# Patient Record
Sex: FEMALE | Race: BLACK OR AFRICAN AMERICAN | Employment: UNEMPLOYED | URBAN - METROPOLITAN AREA
[De-identification: names, ages, dates, MRNs, and addresses within clinical notes are randomized per-mention and may not be internally consistent; named-entity substitution may affect disease eponyms.]

---

## 2018-11-03 DIAGNOSIS — R80.9 PROTEINURIA: Primary | ICD-10-CM

## 2018-11-06 ENCOUNTER — PATIENT OUTREACH (OUTPATIENT)
Dept: CARE COORDINATION | Facility: CLINIC | Age: 53
End: 2018-11-06

## 2018-11-08 ENCOUNTER — APPOINTMENT (OUTPATIENT)
Dept: LAB | Facility: CLINIC | Age: 53
End: 2018-11-08
Payer: MEDICAID

## 2018-11-08 ENCOUNTER — OFFICE VISIT (OUTPATIENT)
Dept: NEPHROLOGY | Facility: CLINIC | Age: 53
End: 2018-11-08
Attending: INTERNAL MEDICINE
Payer: MEDICAID

## 2018-11-08 VITALS
WEIGHT: 177.4 LBS | OXYGEN SATURATION: 97 % | BODY MASS INDEX: 29.52 KG/M2 | SYSTOLIC BLOOD PRESSURE: 102 MMHG | HEART RATE: 71 BPM | TEMPERATURE: 98.1 F | DIASTOLIC BLOOD PRESSURE: 70 MMHG

## 2018-11-08 DIAGNOSIS — R80.9 PROTEINURIA: ICD-10-CM

## 2018-11-08 DIAGNOSIS — R80.9 PROTEINURIA, UNSPECIFIED TYPE: ICD-10-CM

## 2018-11-08 DIAGNOSIS — R31.29 MICROSCOPIC HEMATURIA: ICD-10-CM

## 2018-11-08 DIAGNOSIS — N17.9 AKI (ACUTE KIDNEY INJURY) (H): Primary | ICD-10-CM

## 2018-11-08 LAB
ALBUMIN SERPL-MCNC: 3.2 G/DL (ref 3.4–5)
ALBUMIN UR-MCNC: 100 MG/DL
ANION GAP SERPL CALCULATED.3IONS-SCNC: 8 MMOL/L (ref 3–14)
APPEARANCE UR: CLEAR
BACTERIA #/AREA URNS HPF: ABNORMAL /HPF
BILIRUB UR QL STRIP: NEGATIVE
BUN SERPL-MCNC: 26 MG/DL (ref 7–30)
CALCIUM SERPL-MCNC: 8.6 MG/DL (ref 8.5–10.1)
CHLORIDE SERPL-SCNC: 105 MMOL/L (ref 94–109)
CO2 SERPL-SCNC: 24 MMOL/L (ref 20–32)
COLOR UR AUTO: YELLOW
CREAT SERPL-MCNC: 2.09 MG/DL (ref 0.52–1.04)
CREAT UR-MCNC: 53 MG/DL
CRP SERPL-MCNC: 25.4 MG/L (ref 0–8)
ERYTHROCYTE [DISTWIDTH] IN BLOOD BY AUTOMATED COUNT: 13 % (ref 10–15)
GFR SERPL CREATININE-BSD FRML MDRD: 25 ML/MIN/1.7M2
GLUCOSE SERPL-MCNC: 90 MG/DL (ref 70–99)
GLUCOSE UR STRIP-MCNC: NEGATIVE MG/DL
HCT VFR BLD AUTO: 26.3 % (ref 35–47)
HGB BLD-MCNC: 8.4 G/DL (ref 11.7–15.7)
HGB UR QL STRIP: ABNORMAL
KETONES UR STRIP-MCNC: NEGATIVE MG/DL
LEUKOCYTE ESTERASE UR QL STRIP: ABNORMAL
MCH RBC QN AUTO: 29.5 PG (ref 26.5–33)
MCHC RBC AUTO-ENTMCNC: 31.9 G/DL (ref 31.5–36.5)
MCV RBC AUTO: 92 FL (ref 78–100)
MICROALBUMIN UR-MCNC: 665 MG/L
MICROALBUMIN/CREAT UR: 1259.47 MG/G CR (ref 0–25)
MUCOUS THREADS #/AREA URNS LPF: PRESENT /LPF
NITRATE UR QL: NEGATIVE
PH UR STRIP: 6 PH (ref 5–7)
PHOSPHATE SERPL-MCNC: 3.6 MG/DL (ref 2.5–4.5)
PLATELET # BLD AUTO: 153 10E9/L (ref 150–450)
POTASSIUM SERPL-SCNC: 3.8 MMOL/L (ref 3.4–5.3)
PROT UR-MCNC: 1.05 G/L
PROT/CREAT 24H UR: 2 G/G CR (ref 0–0.2)
RBC # BLD AUTO: 2.85 10E12/L (ref 3.8–5.2)
RBC #/AREA URNS AUTO: 150 /HPF (ref 0–2)
SODIUM SERPL-SCNC: 138 MMOL/L (ref 133–144)
SOURCE: ABNORMAL
SP GR UR STRIP: 1.01 (ref 1–1.03)
SQUAMOUS #/AREA URNS AUTO: <1 /HPF (ref 0–1)
UROBILINOGEN UR STRIP-MCNC: 0 MG/DL (ref 0–2)
WBC # BLD AUTO: 6.1 10E9/L (ref 4–11)
WBC #/AREA URNS AUTO: 8 /HPF (ref 0–5)

## 2018-11-08 PROCEDURE — 83876 ASSAY MYELOPEROXIDASE: CPT | Performed by: INTERNAL MEDICINE

## 2018-11-08 PROCEDURE — 86160 COMPLEMENT ANTIGEN: CPT | Performed by: INTERNAL MEDICINE

## 2018-11-08 PROCEDURE — 83516 IMMUNOASSAY NONANTIBODY: CPT | Performed by: INTERNAL MEDICINE

## 2018-11-08 PROCEDURE — 86039 ANTINUCLEAR ANTIBODIES (ANA): CPT | Performed by: INTERNAL MEDICINE

## 2018-11-08 PROCEDURE — 82784 ASSAY IGA/IGD/IGG/IGM EACH: CPT | Performed by: INTERNAL MEDICINE

## 2018-11-08 PROCEDURE — T1013 SIGN LANG/ORAL INTERPRETER: HCPCS | Mod: U3,ZF

## 2018-11-08 PROCEDURE — G0499 HEPB SCREEN HIGH RISK INDIV: HCPCS | Performed by: INTERNAL MEDICINE

## 2018-11-08 PROCEDURE — 83883 ASSAY NEPHELOMETRY NOT SPEC: CPT | Performed by: INTERNAL MEDICINE

## 2018-11-08 PROCEDURE — 84156 ASSAY OF PROTEIN URINE: CPT | Performed by: INTERNAL MEDICINE

## 2018-11-08 PROCEDURE — 86140 C-REACTIVE PROTEIN: CPT | Performed by: INTERNAL MEDICINE

## 2018-11-08 PROCEDURE — 84165 PROTEIN E-PHORESIS SERUM: CPT | Performed by: INTERNAL MEDICINE

## 2018-11-08 PROCEDURE — 86038 ANTINUCLEAR ANTIBODIES: CPT | Performed by: INTERNAL MEDICINE

## 2018-11-08 PROCEDURE — 86334 IMMUNOFIX E-PHORESIS SERUM: CPT | Performed by: INTERNAL MEDICINE

## 2018-11-08 PROCEDURE — 86060 ANTISTREPTOLYSIN O TITER: CPT | Performed by: INTERNAL MEDICINE

## 2018-11-08 PROCEDURE — 87389 HIV-1 AG W/HIV-1&-2 AB AG IA: CPT | Performed by: INTERNAL MEDICINE

## 2018-11-08 PROCEDURE — 00000402 ZZHCL STATISTIC TOTAL PROTEIN: Performed by: INTERNAL MEDICINE

## 2018-11-08 PROCEDURE — 86215 DEOXYRIBONUCLEASE ANTIBODY: CPT | Performed by: INTERNAL MEDICINE

## 2018-11-08 PROCEDURE — 36415 COLL VENOUS BLD VENIPUNCTURE: CPT | Performed by: INTERNAL MEDICINE

## 2018-11-08 PROCEDURE — 86335 IMMUNFIX E-PHORSIS/URINE/CSF: CPT | Performed by: INTERNAL MEDICINE

## 2018-11-08 PROCEDURE — 84166 PROTEIN E-PHORESIS/URINE/CSF: CPT | Performed by: INTERNAL MEDICINE

## 2018-11-08 PROCEDURE — 86706 HEP B SURFACE ANTIBODY: CPT | Performed by: INTERNAL MEDICINE

## 2018-11-08 PROCEDURE — 82043 UR ALBUMIN QUANTITATIVE: CPT | Performed by: INTERNAL MEDICINE

## 2018-11-08 PROCEDURE — 86803 HEPATITIS C AB TEST: CPT | Performed by: INTERNAL MEDICINE

## 2018-11-08 PROCEDURE — G0463 HOSPITAL OUTPT CLINIC VISIT: HCPCS | Mod: ZF

## 2018-11-08 PROCEDURE — 85027 COMPLETE CBC AUTOMATED: CPT | Performed by: INTERNAL MEDICINE

## 2018-11-08 PROCEDURE — 80069 RENAL FUNCTION PANEL: CPT | Performed by: INTERNAL MEDICINE

## 2018-11-08 PROCEDURE — 81001 URINALYSIS AUTO W/SCOPE: CPT | Performed by: INTERNAL MEDICINE

## 2018-11-08 ASSESSMENT — PAIN SCALES - GENERAL: PAINLEVEL: NO PAIN (0)

## 2018-11-08 NOTE — PROGRESS NOTES
Josey Franco MRN:[id number] YOB: 1965  Date of Admission:(Not on file)  Primary care provider: Edgardo Steele  Requesting physician: Jn Winkler*    ASSESSMENT AND RECOMMENDATIONS:   ***    Recommendations were communicated to primary team via ***    Seen and discussed with  ***    Stella Mazariegos   [phone]      REASON FOR CONSULT: ***    HISTORY OF PRESENT ILLNESS:  Admitting provider and nursing notes reviewed  Josey Franco is a 53 year old ***    PAST MEDICAL HISTORY:  Reviewed with patient on 11/08/2018   ***  Past Medical History:   Diagnosis Date     Anxiety disorder due to general medical condition with panic attack      CHF (congestive heart failure) (H)      Depression      History of tricuspid valve replacement with bioprosthetic valve 11/29/2016     Pacemaker 2013    Medtronic (for high degree AVB)     PTSD (post-traumatic stress disorder)      Rheumatic heart disease      S/P mitral valve replacement with bioprosthetic valve 11/29/2016    In 2012 at [facility]     S/P MVR (mitral valve replacement)     bioprosthetic     S/P TVR (tricuspid valve replacement)     bioprosthetic       Past Surgical History:   Procedure Laterality Date     CARDIAC SURGERY      pacemaker     DILATION AND CURETTAGE, OPERATIVE HYSTEROSCOPY, COMBINED N/A 9/9/2015    Procedure: COMBINED DILATION AND CURETTAGE, OPERATIVE HYSTEROSCOPY;  Surgeon: Zoila Pritchard MD;  Location: [facility] OR     MITRAL VALVE REPLACEMENT  2013    Biosynthetic valve     OPERATIVE HYSTEROSCOPY WITH MORCELLATOR N/A 9/9/2015    Procedure: OPERATIVE HYSTEROSCOPY WITH MORCELLATOR;  Surgeon: Zoila Pritchard MD;  [facility]: [facility]     REPLACE VALVE TRICUSPID  2013    Biosynthetic valve        MEDICATIONS:  PTA Meds  Prior to Admission medications    Medication Sig Last Dose Taking? Auth Provider   Acetaminophen (TYLENOL PO) Take 325 mg by mouth every 6 hours as needed for mild pain or fever 11/28/2016 at Unknown time   Unknown, Entered By History   acetaminophen-codeine (TYLENOL W/CODEINE NO. 3) 300-30 MG per tablet Take 1 tablet by mouth every 6 hours as needed for mild pain Unknown at Unknown time  Zoila Pritchard MD   albuterol (PROAIR HFA, PROVENTIL HFA, VENTOLIN HFA) 108 (90 BASE) MCG/ACT inhaler Inhale 2 puffs into the lungs 11/28/2016 at Unknown time  Reported, Patient   ALPRAZolam (XANAX PO) Take 0.5-1 mg by mouth daily as needed for anxiety 11/28/2016 at Unknown time  Unknown, Entered By History   cholecalciferol (VITAMIN D3) 12192 UNITS capsule Take 50,000 Units by mouth 11/28/2016 at Unknown time  Reported, Patient   diclofenac (VOLTAREN) 1 % GEL  Unknown at Unknown time  Reported, Patient   diltiazem HCl COATED BEADS 240 MG TB24 Take 240 mg by mouth Unknown at Unknown time  Reported, Patient   levofloxacin (LEVAQUIN) 750 MG tablet Take 1 tablet (750 mg) by mouth daily   Justa Whipple MD   levothyroxine (SYNTHROID) 25 mcg/mL Take 25 mcg by mouth daily Unknown at Unknown time  Reported, Patient   meclizine (ANTIVERT) 25 MG tablet Take 1 tablet (25 mg) by mouth 3 times daily as needed for dizziness (For vertigo)   Justa Whipple MD   Multiple Vitamin (DAILY ISMAEL PO) Take by mouth daily Unknown at Unknown time  Reported, Patient   ondansetron (ZOFRAN-ODT) 4 MG disintegrating tablet Take by mouth every 8 hours as needed for nausea Unknown at Unknown time  Reported, Patient   pantoprazole (PROTONIX) 40 MG EC tablet Take 1 tablet (40 mg) by mouth daily   Justa Whipple MD   SERTRALINE HCL PO Take 100 mg by mouth daily Unknown at Unknown time  Unknown, Entered By History   TRAZODONE HCL PO Take 50 mg by mouth nightly as needed for sleep Unknown at Unknown time  Reported, Patient   ursodiol (ACTIGALL) 300 MG capsule Take 300 mg by mouth Unknown at Unknown time  Reported, Patient   Warfarin Sodium (JANTOVEN PO) Take 1 mg by mouth daily 11/28/16: 1 mg Mon/Wed/Fri; 0.5 mg daily rest of week    Reported, Patient      Current Meds    Infusion Meds      ALLERGIES:    No Known Allergies    REVIEW OF SYSTEMS:  A comprehensive of systems was negative except as noted above.    SOCIAL HISTORY:   Social History     Social History     Marital status:      Spouse name: N/A     Number of children: N/A     Years of education: N/A     Occupational History     Not on file.     Social History Main Topics     Smoking status: Never Smoker     Smokeless tobacco: Never Used     Alcohol use No     Drug use: No     Sexual activity: Not Currently     Partners: Male     Other Topics Concern     Not on file     Social History Narrative     Reviewed with patient ***  *** accompanies Josey Franco in hospital room    FAMILY MEDICAL HISTORY:   Family History   Problem Relation Age of Onset     Family history unknown: Yes     Reviewed with patient ***    PHYSICAL EXAM:   @FLOWSTAT(6,921488,[id number],[id number])@      @FLOWSTAT(8)@ @FLOWSTAT(9,597368,10)@       Wt 80.5 kg (177 lb 6.4 oz)  BMI 29.52 kg/m2   [unfilled]   Admit Weight: 80.5 kg (177 lb 6.4 oz)     GENERAL APPEARANCE: *** distress, *** awake  EYES: *** scleral icterus, pupils equal  Endo: *** goiter, *** moon facies  Lymphatics: no cervical or supraclavicular LAD  Pulmonary: lungs clear to auscultation with equal breath sounds bilaterally, *** clubbing  CV: regular rhythm, normal rate, no rub   - JVD ***   - Edema ***  GI: soft, nontender, normal bowel sounds  MS: no evidence of inflammation in joints, no muscle tenderness  : *** davison  SKIN: no rash, warm, dry, no cyanosis  NEURO: face symmetric, *** asterixis     LABS:   CMP@LABRCNTIPR(na:4,potassium:4,chloride:4,co2:4,aniongap:4,g,bun:4,cr:4,gfrestimated:4,gfrestblack:4,santo:4,ma,phos:4,prottotal:4,albumin:4,bilitotal:4,alkphos:4,ast:4,alt:4)@  CBC@LABRCNTIPR(hgb:4,wbc:4,rbc:4,hct:4,mcv:4,mch:4,mchc:4,rdw:4,plt:4)@  INR@LABRCNTIPR(inr:4,ptt:4)@  ABG@LABRCNTIPR(ph:4,pco2:4,po2:4,hco3:4,baseexcess:4,o2per:4)@    URINE STUDIES  Recent Labs   Lab Test  09/01/15   1225  11/11/14   1205   COLOR  Yellow  Light Yellow   APPEARANCE  Clear  Clear   URINEGLC  Negative  Negative   URINEBILI  Negative  Negative   URINEKETONE  Negative  Negative   SG  1.009  1.006   UBLD  Negative  Negative   URINEPH  5.0  7.0   PROTEIN  Negative  Negative   NITRITE  Negative  Negative   LEUKEST  Small*  Negative   RBCU  <1  1   WBCU  <1  1     No lab results found.  PTH  No lab results found.  IRON STUDIES  No lab results found.    IMAGING:  {   Imaging                     :[id number]}     Stella Mazariegos

## 2018-11-08 NOTE — PROGRESS NOTES
Nephrology Initial Consult  2018      Jn Winkler MD  2018     Name: oJsey Franco  MRN: [id number]  Age: 53 year old  : 1965  Referring provider: Edgardo Steele     Assessment and Plan:  SILVIA (acute kidney injury) (H)  ***    Microscopic hematuria  ***       Follow-up: Data Unavailable     HPI:   Admitting provider and nursing notes reviewed.     Josey Franco is a 53 year old female with a history of *** who presents for evaluation of   ***.     Review of Systems:   Pertinent items are noted in HPI or as below, remainder of complete ROS is negative.      Active Medications:     Current Outpatient Prescriptions:      Acetaminophen (TYLENOL PO), Take 325 mg by mouth every 6 hours as needed for mild pain or fever, Disp: , Rfl:      acetaminophen-codeine (TYLENOL W/CODEINE NO. 3) 300-30 MG per tablet, Take 1 tablet by mouth every 6 hours as needed for mild pain, Disp: 10 tablet, Rfl: 0     albuterol (PROAIR HFA, PROVENTIL HFA, VENTOLIN HFA) 108 (90 BASE) MCG/ACT inhaler, Inhale 2 puffs into the lungs, Disp: , Rfl:      ALPRAZolam (XANAX PO), Take 0.5-1 mg by mouth daily as needed for anxiety, Disp: , Rfl:      cholecalciferol (VITAMIN D3) 79795 UNITS capsule, Take 50,000 Units by mouth, Disp: , Rfl:      diclofenac (VOLTAREN) 1 % GEL, , Disp: , Rfl:      levofloxacin (LEVAQUIN) 750 MG tablet, Take 1 tablet (750 mg) by mouth daily, Disp: 5 tablet, Rfl: 0     levothyroxine (SYNTHROID) 25 mcg/mL, Take 25 mcg by mouth daily, Disp: , Rfl:      meclizine (ANTIVERT) 25 MG tablet, Take 1 tablet (25 mg) by mouth 3 times daily as needed for dizziness (For vertigo), Disp: 30 tablet, Rfl: 0     Multiple Vitamin (DAILY ISMAEL PO), Take by mouth daily, Disp: , Rfl:      ursodiol (ACTIGALL) 300 MG capsule, Take 300 mg by mouth, Disp: , Rfl:      Warfarin Sodium (JANTOVEN PO), Take 1 mg by mouth daily 16: 1 mg Mon/Wed/Fri; 0.5 mg daily rest of week, Disp: , Rfl:      diltiazem HCl COATED  BEADS 240 MG TB24, Take 240 mg by mouth, Disp: , Rfl:      ondansetron (ZOFRAN-ODT) 4 MG disintegrating tablet, Take by mouth every 8 hours as needed for nausea, Disp: , Rfl:      pantoprazole (PROTONIX) 40 MG EC tablet, Take 1 tablet (40 mg) by mouth daily (Patient not taking: Reported on 11/8/2018), Disp: 30 tablet, Rfl: 0     SERTRALINE HCL PO, Take 100 mg by mouth daily, Disp: , Rfl:      TRAZODONE HCL PO, Take 50 mg by mouth nightly as needed for sleep, Disp: , Rfl:      Allergies:   The patient reports no known allergies.      Past Medical History:  Anxiety.   CHF.   Depression.   History of tricuspid valve replacement with bioprosthetic valve.   Pacemaker in place ([employer], for high degree AVB).   PTSD.   Rheumatic heart disease.   History of mitral valve replacement with bioprosthetic valve.   Atrial fibrillation.   Chronic cholecystitis.   Heart failure.   Atrioventricular block.   Social maladjustment.     Patient Active Problem List   Diagnosis     Chest pain     Atrial fibrillation (H)     Chronic cholecystitis     Heart failure (H)     Atrioventricular block     Social maladjustment     Major depressive disorder, recurrent episode, moderate (H)     Mitral stenosis     Posttraumatic stress disorder     Functional disturbance following cardiac surgery     Heart disease, rheumatic     Heart valve replaced     Cardiac pacemaker - Single chamber Medtronic     Keloid scar     Hemoptysis     S/P mitral valve replacement with bioprosthetic valve     History of tricuspid valve replacement with bioprosthetic valve        Past Surgical History:  Past Surgical History:   Procedure Laterality Date     CARDIAC SURGERY      pacemaker     DILATION AND CURETTAGE, OPERATIVE HYSTEROSCOPY, COMBINED N/A 9/9/2015    Procedure: COMBINED DILATION AND CURETTAGE, OPERATIVE HYSTEROSCOPY;  Surgeon: Zoila Pritchard MD;  [facility]     MITRAL VALVE REPLACEMENT  2013    Biosynthetic valve     OPERATIVE HYSTEROSCOPY WITH  MORCELLATOR N/A 9/9/2015    Procedure: OPERATIVE HYSTEROSCOPY WITH MORCELLATOR;  Surgeon: Zoila Pritchard MD;  [facility]     REPLACE VALVE TRICUSPID  2013    Biosynthetic valve       Family History:   Family History   Problem Relation Age of Onset     Family history unknown: Yes         Social History:   Social History     Social History     Marital status:      Spouse name: N/A     Number of children: N/A     Years of education: N/A     Occupational History     Not on file.     Social History Main Topics     Smoking status: Never Smoker     Smokeless tobacco: Never Used     Alcohol use No     Drug use: No     Sexual activity: Not Currently     Partners: Male     Other Topics Concern     Not on file     Social History Narrative     Reviewed with patient ***  *** accompanies Josey Franco in exam room.     Physical Exam:  @FLOWSTAT(6,167182,[id number],654999)@      @FLOWSTAT(8)@ @FLOWSTAT(9,191992,10)@       /70  Pulse 71  Temp 98.1  F (36.7  C) (Oral)  Wt 80.5 kg (177 lb 6.4 oz)  SpO2 97%  BMI 29.52 kg/m2   [unfilled]   Admit Weight: 80.5 kg (177 lb 6.4 oz)    GENERAL APPEARANCE: *** distress, *** awake  EYES: *** scleral icterus, pupils equal  Endo: *** goiter, *** moon facies  Lymphatics: no cervical or supraclavicular LAD  Pulmonary: lungs clear to auscultation with equal breath sounds bilaterally, *** clubbing  CV: regular rhythm, normal rate, no rub   - JVD ***   - Edema ***  GI: soft, nontender, normal bowel sounds  MS: no evidence of inflammation in joints, no muscle tenderness  : *** davison  SKIN: no rash, warm, dry, no cyanosis  NEURO: face symmetric, *** asterixis      Laboratory:  CMP  @LABRCNTIPR(na:4,potassium:4,chloride:4,co2:4,aniongap:4,g,bun:4,cr:4,gfrestimated:4,gfrestblack:4,santo:4,ma,phos:4,prottotal:4,albumin:4,bilitotal:4,alkphos:4,ast:4,alt:4)@  CBC  @LABRCNTIPR(hgb:4,wbc:4,rbc:4,hct:4,mcv:4,mch:4,mchc:4,rdw:4,plt:4)@  INR  @LABRCNTIPR(inr:4,ptt:4)@  ABG  @LABRCNTIPR(ph:4,pco2:4,po2:4,hco3:4,baseexcess:4,o2per:4)@   URINE STUDIES  Recent Labs   Lab Test  18   1500  09/01/15   1225  14   1205   COLOR  Yellow  Yellow  Light Yellow   APPEARANCE  Clear  Clear  Clear   URINEGLC  Negative  Negative  Negative   URINEBILI  Negative  Negative  Negative   URINEKETONE  Negative  Negative  Negative   SG  1.006  1.009  1.006   UBLD  Large*  Negative  Negative   URINEPH  6.0  5.0  7.0   PROTEIN  100*  Negative  Negative   NITRITE  Negative  Negative  Negative   LEUKEST  Trace*  Small*  Negative   RBCU  150*  <1  1   WBCU  8*  <1  1     No lab results found.  PTH  No lab results found.  IRON STUDIES  No lab results found.    Imaging:   ***    Scribe Disclosure:   I, Stella Mazariegos, am serving as a scribe to document services personally performed by Jn Winkler MD at this visit, based upon the provider's statements to me. All documentation has been reviewed by the aforementioned provider prior to being entered into the official medical record.     Portions of this medical record were completed by a scribe. UPON MY REVIEW AND AUTHENTICATION BY ELECTRONIC SIGNATURE, this confirms (a) I performed the applicable clinical services, and (b) the record is accurate.

## 2018-11-08 NOTE — NURSING NOTE
Chief Complaint   Patient presents with     Consult      Proteinuria      /70  Pulse 71  Temp 98.1  F (36.7  C) (Oral)  Wt 80.5 kg (177 lb 6.4 oz)  SpO2 97%  BMI 29.52 kg/m2  Irma Waggoner MA

## 2018-11-08 NOTE — PROGRESS NOTES
Nephrology Initial Consult  2018      Jn Winkler MD  2018     Name: Josey Franco  MRN: [id number]  Age: 53 year old  : 1965  Referring provider: Edgardo Steele     Assessment and Plan:     1. Acute/Subacute Kidney Injury: Baseline Cr ~0.8 mg/dL with recent rise to 1.2 mg/dL in August and today even higher at 2.09 mg/dL. She has microscopic hematuria and albuminuria (1260 mg/g today).  Certainly, her kidney function may have declined from decreased renal perfusion/ischemia from volume depletion and lower blood pressures (102/70 today).  However, ongoing microscopic hematuria, albuminuria and rising serum creatinine raises concern for active glomerulonephritis.  At this point, ANCA or IgA - related proliferative glomerulonephritis is high on the differential.  -extensive serologic work-up underway including evaluation for monoclonal gammopathy  -kidney biopsy seems imperative at this time but is difficult in the setting of ongoing coumadin for AF (related to valvular disease) and to some extent bioprosthetic valvular replacements (MV, TV); unclear if heparin bridge is required  -depending on serologic work-up and/or degree of worsening kidney function we may elect to admit patient for a kidney biopsy.  Outpatient work-up and treatment limited by language barrier, complexity of anticoagulation given her history of AF and valvular replacement as well as her potential need for chemotherapy (pending renal biopsy results)  -will repeat renal panel in 4 days and f/u with her at that time          2. BP/Volume: BP lower today at 102/70. Asymptomatic and euvolemic on exam.  Metoprolol XL recently increased.    -no changes made today but would favor SBP closer to 120 to allow for renal perfusion  -otherwise, continue metoprolol XL and digoxin as prescribed by cardiology for now though will consider decreasing if SBP consistently ~100, especially if kidney function continues to  decline.    -f/u with cardiology    3. Anemia: HGB of 8.4 today with a baseline of around 10-11. Asymptomatic. No historical features to suggest acute blood loss. She is on coumadin. No daily asprin. May be related to decreased EPO production from renal insufficiency.  -paraproteinemic work-up underway  -would like to get iron studies  -will consider EMMA at her upcoming clinic visit    4. Rheumatic heart disease s/p bioprosthetic MV and TV, HFpEF, atrial fibrillation, heart block s/p RV pacemaker: No new issues. On Coumadin therapy. Last INR 2.7. Recently seen in cardiology clinic.  She continues on digoxin and metoprolol Xl that was recently increased (see problem 2).      5. Hypothyroidism:  On levothyroxine (25 mcg daily).  She did not tolerate recent increase in dose.    -f/u with PCP    Follow-up: Return in about 1 month (around 12/8/2018).     HPI:   History obtained with the use of an in person interpretor.     Josey Franco is a 53 year old woman with a history of rheumatic heart disease s/p bioprosthetic MV and TV, HFpEF, atrial fibrillation (on coumadin), heart block s/p RV pacemaker, cholelithiasis and  hypothyroidism, who presents with her son and daughter as a new consult after found to have new renal insufficiency. This is a new problem for the patient and as recent as January of this year she had a creatinine of 0.85 (similar to years prior). However, her creatinine was noted to be 1.24 in August after having gone in for evaluation of vague complaints of nausea and a bitter taste in her mouth. At that time she did have a negative renal ultrasound, which was reviewed in care everywhere. She says the aforementioned symptoms of nausea started shortly after her doctor changed her levothyroxine from 25 mg to 50 mg mcg. She has actually been feeling her normal self over the past several days without recent fevers, abdominal pain, flank or back pain, or difficulties urinating. She has been eating and  "drinking normally. She has not noticed any blood in her urine or stool. No recent joint pain or rash. No leg swelling. Other med changes include discontinuing her Ursodiol a few days ago. States she takes \"pain meds\" almost every day, which son thinks is Acetaminophen. It sounds like no NSAID use. She is taking Protonix for acid reflux. No personal or family history of kidney disease. Of note, patient was also just seen by her cardiologist this past Monday and her metoprolol XL was increased from 100 to 200 mg daily.  She remains on digoxin as well.  Her TTE in May, 2018 showed normal EF of 55-60% and severe bilateral atrial enlargement.       Review of Systems:   Pertinent items are noted in HPI or as below, remainder of complete ROS is negative.      Active Medications:      Acetaminophen (TYLENOL PO), Take 325 mg by mouth every 6 hours as needed for mild pain or fever, Disp: , Rfl:      acetaminophen-codeine (TYLENOL W/CODEINE NO. 3) 300-30 MG per tablet, Take 1 tablet by mouth every 6 hours as needed for mild pain, Disp: 10 tablet, Rfl: 0     albuterol (PROAIR HFA, PROVENTIL HFA, VENTOLIN HFA) 108 (90 BASE) MCG/ACT inhaler, Inhale 2 puffs into the lungs, Disp: , Rfl:      ALPRAZolam (XANAX PO), Take 0.5-1 mg by mouth daily as needed for anxiety, Disp: , Rfl:      cholecalciferol (VITAMIN D3) 71918 UNITS capsule, Take 50,000 Units by mouth, Disp: , Rfl:      diclofenac (VOLTAREN) 1 % GEL, , Disp: , Rfl:      levofloxacin (LEVAQUIN) 750 MG tablet, Take 1 tablet (750 mg) by mouth daily, Disp: 5 tablet, Rfl: 0     levothyroxine (SYNTHROID) 25 mcg/mL, Take 25 mcg by mouth daily, Disp: , Rfl:      meclizine (ANTIVERT) 25 MG tablet, Take 1 tablet (25 mg) by mouth 3 times daily as needed for dizziness (For vertigo), Disp: 30 tablet, Rfl: 0     Multiple Vitamin (DAILY ISMAEL PO), Take by mouth daily, Disp: , Rfl:      ursodiol (ACTIGALL) 300 MG capsule, Take 300 mg by mouth, Disp: , Rfl:      Warfarin Sodium (JANTOVEN PO), " Take 1 mg by mouth daily 11/28/16: 1 mg Mon/Wed/Fri; 0.5 mg daily rest of week, Disp: , Rfl:      diltiazem HCl COATED BEADS 240 MG TB24, Take 240 mg by mouth, Disp: , Rfl:      ondansetron (ZOFRAN-ODT) 4 MG disintegrating tablet, Take by mouth every 8 hours as needed for nausea, Disp: , Rfl:      pantoprazole (PROTONIX) 40 MG EC tablet, Take 1 tablet (40 mg) by mouth daily (Patient not taking: Reported on 11/8/2018), Disp: 30 tablet, Rfl: 0     SERTRALINE HCL PO, Take 100 mg by mouth daily, Disp: , Rfl:      TRAZODONE HCL PO, Take 50 mg by mouth nightly as needed for sleep, Disp: , Rfl:      Allergies:   The patient reports no known allergies.      Past Medical History:  1. Atrial fibrillation, on coumadin   2. Hx of valve replacement, on coumadin   3. Hypothyroidism, on levothyroxine   4. Depression and anxiety   5. CHF  6. Chronic cholecystitis      Past Surgical History:  Cardiac pacemaker   Mitral valve replacement   Tricuspid valve replacement     Family History:   No family hx of kidney disease       Social History:   Here today with her son and daughter. She is non-english speaking. She has been in the [place] for 6 years.     Physical Exam:  /70  Pulse 71  Temp 98.1  F (36.7  C) (Oral)  Wt 80.5 kg (177 lb 6.4 oz)  SpO2 97%  BMI 29.52 kg/m2   [unfilled]   Admit Weight: 80.5 kg (177 lb 6.4 oz)    GENERAL APPEARANCE: NAD  EYES:no scleral icterus, pupils equal  Endo: no moon facies  Lymphatics: no cervical or supraclavicular LAD  Pulmonary: lungs clear to auscultation with equal breath sounds bilaterally  CV: regular rhythm, normal rate, no rub   - No JVD   - No significant edema  GI: soft, nontender, nondistended  MS: no evidence of inflammation in joints, no muscle tenderness  : No CVA tenderness  SKIN: no concerning rash  NEURO: no gross focal deficit    Laboratory:  URINE STUDIES  Recent Labs   Lab Test  11/08/18   1500  09/01/15   1225  11/11/14   1205   COLOR  Yellow  Yellow  Light Yellow    APPEARANCE  Clear  Clear  Clear   URINEGLC  Negative  Negative  Negative   URINEBILI  Negative  Negative  Negative   URINEKETONE  Negative  Negative  Negative   SG  1.006  1.009  1.006   UBLD  Large*  Negative  Negative   URINEPH  6.0  5.0  7.0   PROTEIN  100*  Negative  Negative   NITRITE  Negative  Negative  Negative   LEUKEST  Trace*  Small*  Negative   RBCU  150*  <1  1   WBCU  8*  <1  1     Recent Labs   Lab Test  11/08/18   1500   [facility]  2.00*     PTH  No lab results found.  IRON STUDIES  No lab results found.    Imaging:   10/17/18 Renal U/S:  FINDINGS:  The right kidney measures 11.0 cm in length. Normal cortical echogenicity and thickness. No hydronephrosis.  The left kidney was somewhat more difficult to see. The left kidney measures 11.5 cm in length. Normal cortical echogenicity and thickness. No hydronephrosis.   The urinary bladder was not well distended and measured 6.6 x 4.0 x 3.4 cm.    IMPRESSION:  Left kidney somewhat difficult to visualize, but the kidneys are otherwise unremarkable. No hydronephrosis, either kidney.    Scribe Disclosure:   I, Stella Mazariegos, am serving as a scribe to document services personally performed by Jn Winkler MD at this visit, based upon the provider's statements to me. All documentation has been reviewed by the aforementioned provider prior to being entered into the official medical record.     Portions of this medical record were completed by a scribe. UPON MY REVIEW AND AUTHENTICATION BY ELECTRONIC SIGNATURE, this confirms (a) I performed the applicable clinical services, and (b) the record is accurate.    Jn Winkler MD

## 2018-11-08 NOTE — MR AVS SNAPSHOT
After Visit Summary   11/8/2018    Josey Franco    MRN: [id number]           Patient Information     Date Of Birth          1965        Visit Information        Provider Department      11/8/2018 3:00 PM Ben Schwab; Jn Winkler MD [facility]        Today's Diagnoses     SILVIA (acute kidney injury) (H)    -  1    Microscopic hematuria        Proteinuria, unspecified type           Follow-ups after your visit        Follow-up notes from your care team     Return in about 1 month (around 12/8/2018).      Your next 10 appointments already scheduled     Dec 27, 2018 12:00 PM CST   Lab with  LAB   [facility] ([facility])    [address]  1st Floor  [facility] [address] [phone]            Dec 27, 2018  1:00 PM CST   (Arrive by 12:30 PM)   Return Visit with Jn Winkler MD   [facility] ([facility])    [address]  [address]  [facility] [address] [phone]              Who to contact     If you have questions or need follow up information about today's clinic visit or your schedule please contact [facility] directly at [phone].  Normal or non-critical lab and imaging results will be communicated to you by MyChart, letter or phone within 4 business days after the clinic has received the results. If you do not hear from us within 7 days, please contact the clinic through [employer] or phone. If you have a critical or abnormal lab result, we will notify you by phone as soon as possible.  Submit refill requests through [employer] or call your pharmacy and they will forward the refill request to us. Please allow 3 business days for your refill to be completed.          Additional Information About Your Visit        [employer] Information     [employer] lets you send messages to your doctor, view your test results, renew your prescriptions, schedule appointments and more.  "To sign up, go to [email or web address] . Click on \"Log in\" on the left side of the screen, which will take you to the Welcome page. Then click on \"Sign up Now\" on the right side of the page.     You will be asked to enter the access code listed below, as well as some personal information. Please follow the directions to create your username and password.     Your access code is: [email or web address]  Expires: 2/3/2019  6:30 AM     Your access code will  in 90 days. If you need help or a new code, please call your [facility] clinic or [phone].        Care EveryWhere ID     This is your Care EveryWhere ID. This could be used by other organizations to access your [facility] medical records  [id number]        Your Vitals Were     Pulse Temperature Pulse Oximetry BMI (Body Mass Index)          71 98.1  F (36.7  C) (Oral) 97% 29.52 kg/m2         Blood Pressure from Last 3 Encounters:   18 102/70   16 119/77   16 120/84    Weight from Last 3 Encounters:   18 80.5 kg (177 lb 6.4 oz)   16 83.9 kg (185 lb)   16 83 kg (183 lb)              We Performed the Following     Anti DNase B antibodies     Antistreptolysin O     Complement C3     Complement C4     CRP inflammation     GBM Antibody IgG     Hepatitis B Surface Antibody     Hepatitis B surface antigen     Hepatitis C antibody     HIV Antigen Antibody Combo     Kappa and lambda light chain     Protein electrophoresis random urine     Protein electrophoresis     Protein Immunofixation Serum     Protein immunofixation urine     Vasculitis panel        Primary Care Provider Office Phone # Fax #    Edgardo Steele [phone]       [facility] [address] [place] [address]        Equal Access to Services     DEBRA THOMPSON : Jeremias Whipple, rayna boykin, bouchra molina. So [facility] [phone].    ATENCIÓN: Si jasiel morley " shane disposición servicios gratuitos de asistencia lingüística. Savanna royal [phone].    We comply with applicable federal civil rights laws and [place] laws. We do not discriminate on the basis of race, color, national origin, age, disability, sex, sexual orientation, or gender identity.            Thank you!     Thank you for choosing [facility]  for your care. Our goal is always to provide you with excellent care. Hearing back from our patients is one way we can continue to improve our services. Please take a few minutes to complete the written survey that you may receive in the mail after your visit with us. Thank you!             Your Updated Medication List - Protect others around you: Learn how to safely use, store and throw away your medicines at www.disposemymeds.org.          This list is accurate as of 11/8/18  4:51 PM.  Always use your most recent med list.                   Brand Name Dispense Instructions for use Diagnosis    acetaminophen-codeine 300-30 MG per tablet    TYLENOL w/CODEINE No. 3    10 tablet    Take 1 tablet by mouth every 6 hours as needed for mild pain    Post-op pain       albuterol 108 (90 Base) MCG/ACT inhaler    PROAIR HFA/PROVENTIL HFA/VENTOLIN HFA     Inhale 2 puffs into the lungs        cholecalciferol 71238 units capsule    VITAMIN D3     Take 50,000 Units by mouth        DAILY ISMAEL PO      Take by mouth daily        diclofenac 1 % Gel topical gel    VOLTAREN          diltiazem HCl COATED BEADS 240 MG Tb24      Take 240 mg by mouth        JANTOVEN PO      Take 1 mg by mouth daily 11/28/16: 1 mg Mon/Wed/Fri; 0.5 mg daily rest of week        levofloxacin 750 MG tablet    LEVAQUIN    5 tablet    Take 1 tablet (750 mg) by mouth daily    Positive culture findings in sputum       levothyroxine 25 mcg/mL Susp    SYNTHROID     Take 25 mcg by mouth daily        meclizine 25 MG tablet    ANTIVERT    30 tablet    Take 1 tablet (25 mg) by mouth 3 times daily as needed for  dizziness (For vertigo)    Vertigo       ondansetron 4 MG ODT tab    ZOFRAN-ODT     Take by mouth every 8 hours as needed for nausea        pantoprazole 40 MG EC tablet    PROTONIX    30 tablet    Take 1 tablet (40 mg) by mouth daily    Hemoptysis       SERTRALINE HCL PO      Take 100 mg by mouth daily        TRAZODONE HCL PO      Take 50 mg by mouth nightly as needed for sleep        TYLENOL PO      Take 325 mg by mouth every 6 hours as needed for mild pain or fever        ursodiol 300 MG capsule    ACTIGALL     Take 300 mg by mouth        XANAX PO      Take 0.5-1 mg by mouth daily as needed for anxiety

## 2018-11-08 NOTE — LETTER
2018      RE: Josey Frnaco  [address]  [address]  [facility] [address]         Nephrology Initial Consult  2018      Jn Winkler MD  2018     Name: Josey Franco  MRN: [id number]  Age: 53 year old  : 1965  Referring provider: Edgardo Steele     Assessment and Plan:     1. Acute/Subacute Kidney Injury: Baseline Cr ~0.8 mg/dL with recent rise to 1.2 mg/dL in August and today even higher at 2.09 mg/dL. She has microscopic hematuria and albuminuria (1260 mg/g today).  Certainly, her kidney function may have declined from decreased renal perfusion/ischemia from volume depletion and lower blood pressures (102/70 today).  However, ongoing microscopic hematuria, albuminuria and rising serum creatinine raises concern for active glomerulonephritis.  At this point, ANCA or IgA - related proliferative glomerulonephritis is high on the differential.  -extensive serologic work-up underway including evaluation for monoclonal gammopathy  -kidney biopsy seems imperative at this time but is difficult in the setting of ongoing coumadin for AF (related to valvular disease) and to some extent bioprosthetic valvular replacements (MV, TV); unclear if heparin bridge is required  -depending on serologic work-up and/or degree of worsening kidney function we may elect to admit patient for a kidney biopsy.  Outpatient work-up and treatment limited by language barrier, complexity of anticoagulation given her history of AF and valvular replacement as well as her potential need for chemotherapy (pending renal biopsy results)  -will repeat renal panel in 4 days and f/u with her at that time          2. BP/Volume: BP lower today at 102/70. Asymptomatic and euvolemic on exam.  Metoprolol XL recently increased.    -no changes made today but would favor SBP closer to 120 to allow for renal perfusion  -otherwise, continue metoprolol XL and digoxin as prescribed by cardiology for now though will  consider decreasing if SBP consistently ~100, especially if kidney function continues to decline.    -f/u with cardiology    3. Anemia: HGB of 8.4 today with a baseline of around 10-11. Asymptomatic. No historical features to suggest acute blood loss. She is on coumadin. No daily asprin. May be related to decreased EPO production from renal insufficiency.  -paraproteinemic work-up underway  -would like to get iron studies  -will consider EMMA at her upcoming clinic visit    4. Rheumatic heart disease s/p bioprosthetic MV and TV, HFpEF, atrial fibrillation, heart block s/p RV pacemaker: No new issues. On Coumadin therapy. Last INR 2.7. Recently seen in cardiology clinic.  She continues on digoxin and metoprolol Xl that was recently increased (see problem 2).      5. Hypothyroidism:  On levothyroxine (25 mcg daily).  She did not tolerate recent increase in dose.    -f/u with PCP    Follow-up: Return in about 1 month (around 12/8/2018).     HPI:   History obtained with the use of an in person interpretor.     Josey Franco is a 53 year old woman with a history of rheumatic heart disease s/p bioprosthetic MV and TV, HFpEF, atrial fibrillation (on coumadin), heart block s/p RV pacemaker, cholelithiasis and  hypothyroidism, who presents with her son and daughter as a new consult after found to have new renal insufficiency. This is a new problem for the patient and as recent as January of this year she had a creatinine of 0.85 (similar to years prior). However, her creatinine was noted to be 1.24 in August after having gone in for evaluation of vague complaints of nausea and a bitter taste in her mouth. At that time she did have a negative renal ultrasound, which was reviewed in care everywhere. She says the aforementioned symptoms of nausea started shortly after her doctor changed her levothyroxine from 25 mg to 50 mg mcg. She has actually been feeling her normal self over the past several days without recent fevers,  "abdominal pain, flank or back pain, or difficulties urinating. She has been eating and drinking normally. She has not noticed any blood in her urine or stool. No recent joint pain or rash. No leg swelling. Other med changes include discontinuing her Ursodiol a few days ago. States she takes \"pain meds\" almost every day, which son thinks is Acetaminophen. It sounds like no NSAID use. She is taking Protonix for acid reflux. No personal or family history of kidney disease. Of note, patient was also just seen by her cardiologist this past Monday and her metoprolol XL was increased from 100 to 200 mg daily.  She remains on digoxin as well.  Her TTE in May, 2018 showed normal EF of 55-60% and severe bilateral atrial enlargement.       Review of Systems:   Pertinent items are noted in HPI or as below, remainder of complete ROS is negative.      Active Medications:      Acetaminophen (TYLENOL PO), Take 325 mg by mouth every 6 hours as needed for mild pain or fever, Disp: , Rfl:      acetaminophen-codeine (TYLENOL W/CODEINE NO. 3) 300-30 MG per tablet, Take 1 tablet by mouth every 6 hours as needed for mild pain, Disp: 10 tablet, Rfl: 0     albuterol (PROAIR HFA, PROVENTIL HFA, VENTOLIN HFA) 108 (90 BASE) MCG/ACT inhaler, Inhale 2 puffs into the lungs, Disp: , Rfl:      ALPRAZolam (XANAX PO), Take 0.5-1 mg by mouth daily as needed for anxiety, Disp: , Rfl:      cholecalciferol (VITAMIN D3) 95197 UNITS capsule, Take 50,000 Units by mouth, Disp: , Rfl:      diclofenac (VOLTAREN) 1 % GEL, , Disp: , Rfl:      levofloxacin (LEVAQUIN) 750 MG tablet, Take 1 tablet (750 mg) by mouth daily, Disp: 5 tablet, Rfl: 0     levothyroxine (SYNTHROID) 25 mcg/mL, Take 25 mcg by mouth daily, Disp: , Rfl:      meclizine (ANTIVERT) 25 MG tablet, Take 1 tablet (25 mg) by mouth 3 times daily as needed for dizziness (For vertigo), Disp: 30 tablet, Rfl: 0     Multiple Vitamin (DAILY ISMAEL PO), Take by mouth daily, Disp: , Rfl:      ursodiol (ACTIGALL) " 300 MG capsule, Take 300 mg by mouth, Disp: , Rfl:      Warfarin Sodium (JANTOVEN PO), Take 1 mg by mouth daily 11/28/16: 1 mg Mon/Wed/Fri; 0.5 mg daily rest of week, Disp: , Rfl:      diltiazem HCl COATED BEADS 240 MG TB24, Take 240 mg by mouth, Disp: , Rfl:      ondansetron (ZOFRAN-ODT) 4 MG disintegrating tablet, Take by mouth every 8 hours as needed for nausea, Disp: , Rfl:      pantoprazole (PROTONIX) 40 MG EC tablet, Take 1 tablet (40 mg) by mouth daily (Patient not taking: Reported on 11/8/2018), Disp: 30 tablet, Rfl: 0     SERTRALINE HCL PO, Take 100 mg by mouth daily, Disp: , Rfl:      TRAZODONE HCL PO, Take 50 mg by mouth nightly as needed for sleep, Disp: , Rfl:      Allergies:   The patient reports no known allergies.      Past Medical History:  1. Atrial fibrillation, on coumadin   2. Hx of valve replacement, on coumadin   3. Hypothyroidism, on levothyroxine   4. Depression and anxiety   5. CHF  6. Chronic cholecystitis      Past Surgical History:  Cardiac pacemaker   Mitral valve replacement   Tricuspid valve replacement     Family History:   No family hx of kidney disease       Social History:   Here today with her son and daughter. She is non-english speaking. She has been in the [place] 6 [place].     Physical Exam:  /70  Pulse 71  Temp 98.1  F (36.7  C) (Oral)  Wt 80.5 kg (177 lb 6.4 oz)  SpO2 97%  BMI 29.52 kg/m2   [unfilled]   Admit Weight: 80.5 kg (177 lb 6.4 oz)    GENERAL APPEARANCE: NAD  EYES:no scleral icterus, pupils equal  Endo: no moon facies  Lymphatics: no cervical or supraclavicular LAD  Pulmonary: lungs clear to auscultation with equal breath sounds bilaterally  CV: regular rhythm, normal rate, no rub   - No JVD   - No significant edema  GI: soft, nontender, nondistended  MS: no evidence of inflammation in joints, no muscle tenderness  : No CVA tenderness  SKIN: no concerning rash  NEURO: no gross focal deficit    Laboratory:  URINE STUDIES  Recent Labs   Lab Test   11/08/18   1500  09/01/15   1225  11/11/14   1205   COLOR  Yellow  Yellow  Light Yellow   APPEARANCE  Clear  Clear  Clear   URINEGLC  Negative  Negative  Negative   URINEBILI  Negative  Negative  Negative   URINEKETONE  Negative  Negative  Negative   SG  1.006  1.009  1.006   UBLD  Large*  Negative  Negative   URINEPH  6.0  5.0  7.0   PROTEIN  100*  Negative  Negative   NITRITE  Negative  Negative  Negative   LEUKEST  Trace*  Small*  Negative   RBCU  150*  <1  1   WBCU  8*  <1  1     Recent Labs   Lab Test  11/08/18   1500   [facility]  2.00*     PTH  No lab results found.  IRON STUDIES  No lab results found.    Imaging:   10/17/18 Renal U/S:  FINDINGS:  The right kidney measures 11.0 cm in length. Normal cortical echogenicity and thickness. No hydronephrosis.  The left kidney was somewhat more difficult to see. The left kidney measures 11.5 cm in length. Normal cortical echogenicity and thickness. No hydronephrosis.   The urinary bladder was not well distended and measured 6.6 x 4.0 x 3.4 cm.    IMPRESSION:  Left kidney somewhat difficult to visualize, but the kidneys are otherwise unremarkable. No hydronephrosis, either kidney.    Scribe Disclosure:   I, Stella Mazariegos, am serving as a scribe to document services personally performed by Jn Winkler MD at this visit, based upon the provider's statements to me. All documentation has been reviewed by the aforementioned provider prior to being entered into the official medical record.     Portions of this medical record were completed by a scribe. UPON MY REVIEW AND AUTHENTICATION BY ELECTRONIC SIGNATURE, this confirms (a) I performed the applicable clinical services, and (b) the record is accurate.    Jn Winkler MD

## 2018-11-09 DIAGNOSIS — R79.89 ELEVATED SERUM CREATININE: Primary | ICD-10-CM

## 2018-11-09 LAB
ALBUMIN MFR UR ELPH: 66.6 %
ALBUMIN SERPL ELPH-MCNC: 3.9 G/DL (ref 3.7–5.1)
ALPHA1 GLOB MFR UR ELPH: 4.8 %
ALPHA1 GLOB SERPL ELPH-MCNC: 0.4 G/DL (ref 0.2–0.4)
ALPHA2 GLOB MFR UR ELPH: 5.9 %
ALPHA2 GLOB SERPL ELPH-MCNC: 0.9 G/DL (ref 0.5–0.9)
B-GLOBULIN MFR UR ELPH: 13.1 %
B-GLOBULIN SERPL ELPH-MCNC: 1.1 G/DL (ref 0.6–1)
C3 SERPL-MCNC: 114 MG/DL (ref 76–169)
C4 SERPL-MCNC: 38 MG/DL (ref 15–50)
GAMMA GLOB MFR UR ELPH: 9.6 %
GAMMA GLOB SERPL ELPH-MCNC: 2.1 G/DL (ref 0.7–1.6)
GBM IGG SER IA-ACNC: <0.2 AI (ref 0–0.9)
HBV SURFACE AB SERPL IA-ACNC: 899.43 M[IU]/ML
HBV SURFACE AG SERPL QL IA: NONREACTIVE
HCV AB SERPL QL IA: NONREACTIVE
HIV 1+2 AB+HIV1 P24 AG SERPL QL IA: NONREACTIVE
IGA SERPL-MCNC: 531 MG/DL (ref 70–380)
IGG SERPL-MCNC: 1970 MG/DL (ref 695–1620)
IGM SERPL-MCNC: 230 MG/DL (ref 60–265)
KAPPA LC UR-MCNC: 3.94 MG/DL (ref 0.33–1.94)
KAPPA LC/LAMBDA SER: 0.62 {RATIO} (ref 0.26–1.65)
LAMBDA LC SERPL-MCNC: 6.33 MG/DL (ref 0.57–2.63)
M PROTEIN MFR UR ELPH: 0 %
M PROTEIN SERPL ELPH-MCNC: 0 G/DL
MYELOPEROXIDASE AB SER-ACNC: <0.2 AI (ref 0–0.9)
PROT ELPH PNL UR ELPH: NORMAL
PROT PATTERN SERPL ELPH-IMP: ABNORMAL
PROT PATTERN SERPL IFE-IMP: ABNORMAL
PROT PATTERN UR ELPH-IMP: ABNORMAL
PROTEINASE3 IGG SER-ACNC: 0.6 AI (ref 0–0.9)

## 2018-11-09 NOTE — PROGRESS NOTES
Per Dr. Winkler, recommend renal panel on Monday. Call to patient using  services, communicated to patient who will get lab drawn on Monday after 1030, asked to schedule appointment to discuss results and follow up then with . Will update Dr. Winkler.  Ann Lu LPN  Nephrology  [phone]

## 2018-11-12 ENCOUNTER — OFFICE VISIT (OUTPATIENT)
Dept: NEPHROLOGY | Facility: CLINIC | Age: 53
End: 2018-11-12
Attending: INTERNAL MEDICINE
Payer: MEDICAID

## 2018-11-12 VITALS
SYSTOLIC BLOOD PRESSURE: 116 MMHG | HEIGHT: 65 IN | HEART RATE: 80 BPM | DIASTOLIC BLOOD PRESSURE: 81 MMHG | WEIGHT: 176.4 LBS | BODY MASS INDEX: 29.39 KG/M2 | OXYGEN SATURATION: 100 % | TEMPERATURE: 97.2 F

## 2018-11-12 DIAGNOSIS — R79.89 ELEVATED SERUM CREATININE: ICD-10-CM

## 2018-11-12 DIAGNOSIS — N17.9 AKI (ACUTE KIDNEY INJURY) (H): Primary | ICD-10-CM

## 2018-11-12 LAB
ALBUMIN SERPL-MCNC: 3.4 G/DL (ref 3.4–5)
ANA PAT SER IF-IMP: ABNORMAL
ANA SER QL IF: ABNORMAL
ANA TITR SER IF: ABNORMAL {TITER}
ANION GAP SERPL CALCULATED.3IONS-SCNC: 7 MMOL/L (ref 3–14)
ASO AB SERPL-ACNC: <25 IU/ML (ref 0–120)
BUN SERPL-MCNC: 23 MG/DL (ref 7–30)
CALCIUM SERPL-MCNC: 8.8 MG/DL (ref 8.5–10.1)
CHLORIDE SERPL-SCNC: 106 MMOL/L (ref 94–109)
CO2 SERPL-SCNC: 24 MMOL/L (ref 20–32)
CREAT SERPL-MCNC: 1.86 MG/DL (ref 0.52–1.04)
GFR SERPL CREATININE-BSD FRML MDRD: 28 ML/MIN/1.7M2
GLUCOSE SERPL-MCNC: 78 MG/DL (ref 70–99)
PHOSPHATE SERPL-MCNC: 3.9 MG/DL (ref 2.5–4.5)
POTASSIUM SERPL-SCNC: 4.2 MMOL/L (ref 3.4–5.3)
SODIUM SERPL-SCNC: 137 MMOL/L (ref 133–144)
STREP DNASE B SER-ACNC: 51.1 U/ML

## 2018-11-12 PROCEDURE — 80069 RENAL FUNCTION PANEL: CPT | Performed by: INTERNAL MEDICINE

## 2018-11-12 PROCEDURE — 36415 COLL VENOUS BLD VENIPUNCTURE: CPT | Performed by: INTERNAL MEDICINE

## 2018-11-12 PROCEDURE — G0463 HOSPITAL OUTPT CLINIC VISIT: HCPCS | Mod: ZF

## 2018-11-12 RX ORDER — METOPROLOL SUCCINATE 200 MG/1
100 TABLET, EXTENDED RELEASE ORAL DAILY
Refills: 3 | COMMUNITY
Start: 2018-05-04

## 2018-11-12 ASSESSMENT — PAIN SCALES - GENERAL: PAINLEVEL: NO PAIN (0)

## 2018-11-12 NOTE — LETTER
2018      RE: Josey Franco  [address]  [address]  [facility] [address]         Nephrology Follow-up  2018      Jn Winkler MD  2018     Name: Josey Franco  MRN: [id number]  Age: 53 year old  : 1965  Referring provider: Edgardo Steele     Assessment and Plan:  1. Acute/Subacute Kidney Injury: Baseline Cr ~0.8 mg/dL with recent rise to 2.4 mg/dL though is much lower today at 1.8 mg/dL.  She has microscopic hematuria and albuminuria (1260 mg/g today).  Certainly, her kidney function may have declined from decreased renal perfusion/ischemia from volume depletion and lower blood pressures (102/70 today).  However, ongoing microscopic hematuria, albuminuria and elevated creatinine raises concern for active glomerulonephritis.  At this point, ANCA or IgA - related proliferative glomerulonephritis is high on the differential.  - Patient has deferred on biopsy and further treatments at this time, is electing to continue with plans of traveling home, waiting and seeing, and further discussing biopsy and treatments with her family.   - We will plan on completing weekly laboratory studies to evaluate her renal function. Patient is agreeable to canceling her trip should her renal functions continue to worsen.   - I discussed the potential for irreparable damage to the kidneys and the possibility of requiring dialysis should continued renal damage occur.   - I also informed and educated the patient of dialysis.  - There is no evidence of infection at this time per urinalysis completed one week ago.  - She voiced understanding of all risks involved with deferring on biopsy and further treatments for the time being  - Fortunately, her renal function is improving suggesting that much of her rise in Cr may have been due to a hemodynamic insult but underlying glomerular disorder seems likely though may not be as active as I initially thought  - RTC in 3 months    2.  BP/Volume: BP  higher today at 116/81 as compared to her last evaluation. Asymptomatic and euvolemic on exam.  Metoprolol XL recently increased.    -no changes made today but would favor SBP closer to 120 to allow for renal perfusion  -otherwise, continue metoprolol XL and digoxin as prescribed by cardiology for now though will consider decreasing if SBP consistently ~100, especially if kidney function continues to decline.    -f/u with cardiology     3. Anemia: HGB of 8.4 on 11/08/18 with a baseline of around 10-11. Asymptomatic. No historical features to suggest acute blood loss. She is on coumadin. No daily asprin. May be related to decreased EPO production from renal insufficiency.  -paraproteinemic work-up unremarkable  -would like to get iron studies  -will consider EMMA at her upcoming clinic visit     4. Rheumatic heart disease s/p bioprosthetic MV and TV, HFpEF, atrial fibrillation, heart block s/p RV pacemaker: No new issues. On Coumadin therapy. Last INR 2.7. Recently seen in cardiology clinic.  She continues on digoxin and metoprolol Xl that was recently increased (see problem 2).       5. Hypothyroidism:  On levothyroxine (25 mcg daily).  She did not tolerate recent increase in dose.    -f/u with PCP    Follow-up: Return in about 3 months (around 2/12/2019).     HPI:   History obtained with the use of an in person interpretor.     Josey Franco is a 53 year old female with a history of rheumatic heart disease, is status-post bioprosthetic MV and TV, HFpEF, atrial fibrillation (on coumadin), heart block status-post RV pacemaker, cholelithiasis, and hypothyroidism who presents for follow-up of acute/subacute kidney injury. I last evaluated this patient on 11/08/18, at which time she reported a creatinine of 1.24 in august following vague complains of nausea and a bitter taste in her mouth. Renal ultrasound was negative at that time. She had been feeling relatively well the days leading up to her last  appointment with myself without any abdominal pain, fevers, flank pain, back pain, or difficulty with urination. Today, patient reports things have gone well since her last evaluation. She has not noted any difficulty in urination. Of note, she is planning on traveling home for a long period of time.     Review of Systems:   Pertinent items are noted in HPI or as below, remainder of complete ROS is negative.      Active Medications:   Current Outpatient Prescriptions:      Acetaminophen (TYLENOL PO), Take 325 mg by mouth every 6 hours as needed for mild pain or fever, Disp: , Rfl:      albuterol (PROAIR HFA, PROVENTIL HFA, VENTOLIN HFA) 108 (90 BASE) MCG/ACT inhaler, Inhale 2 puffs into the lungs, Disp: , Rfl:      ALPRAZolam (XANAX PO), Take 0.5-1 mg by mouth daily as needed for anxiety, Disp: , Rfl:      cholecalciferol (VITAMIN D3) 19896 UNITS capsule, Take 50,000 Units by mouth, Disp: , Rfl:      diclofenac (VOLTAREN) 1 % GEL, , Disp: , Rfl:      diltiazem HCl COATED BEADS 240 MG TB24, Take 240 mg by mouth, Disp: , Rfl:      levofloxacin (LEVAQUIN) 750 MG tablet, Take 1 tablet (750 mg) by mouth daily, Disp: 5 tablet, Rfl: 0     levothyroxine (SYNTHROID) 25 mcg/mL, Take 25 mcg by mouth daily, Disp: , Rfl:      meclizine (ANTIVERT) 25 MG tablet, Take 1 tablet (25 mg) by mouth 3 times daily as needed for dizziness (For vertigo), Disp: 30 tablet, Rfl: 0     Multiple Vitamin (DAILY ISMAEL PO), Take by mouth daily, Disp: , Rfl:      ondansetron (ZOFRAN-ODT) 4 MG disintegrating tablet, Take by mouth every 8 hours as needed for nausea, Disp: , Rfl:      pantoprazole (PROTONIX) 40 MG EC tablet, Take 1 tablet (40 mg) by mouth daily (Patient not taking: Reported on 11/8/2018), Disp: 30 tablet, Rfl: 0     SERTRALINE HCL PO, Take 100 mg by mouth daily, Disp: , Rfl:      TRAZODONE HCL PO, Take 50 mg by mouth nightly as needed for sleep, Disp: , Rfl:      ursodiol (ACTIGALL) 300 MG capsule, Take 300 mg by mouth, Disp: , Rfl:      " Warfarin Sodium (JANTOVEN PO), Take 1 mg by mouth daily 11/28/16: 1 mg Mon/Wed/Fri; 0.5 mg daily rest of week, Disp: , Rfl:      Allergies:   No known drug allergies      Past Medical History:  Congestive heart failure  Depression  Anxiety  PTSD  Rheumatic heart disease  Atrial fibrillation  Chronic cholecystitis  Heart Failure  Atrioventricular block  Mitral stenosis     Past Surgical History:  Pacemaker placement  Dilation and curettage  Mitral valve replacement  Hysteroscopy  Tricuspid valve replacement     Family History:   Family history is unknown to the patient      Social History:     Never smoker. Does not consume alcohol.     Reviewed with patient in exam room.     Physical Exam:  /81  Pulse 80  Temp 97.2  F (36.2  C) (Oral)  Ht 1.657 m (5' 5.25\")  Wt 80 kg (176 lb 6.4 oz)  SpO2 100%  BMI 29.13 kg/m2   [unfilled]   Admit      GENERAL APPEARANCE: NAD  EYES: no scleral icterus, pupils equal  Endo: no moon facies  Lymphatics: no cervical or supraclavicular LAD  Pulmonary: lungs clear to auscultation with equal breath sounds bilaterally  CV: regular rhythm, normal rate, no rub   - No JVD    - No edema *  GI: soft, nontender, nondistended  MS: no evidence of inflammation in joints, no muscle tenderness  : No CVA tendereness  SKIN: no concerning rash  NEURO: no focal deficit    Laboratory:  CMP  Recent Labs   Lab Test  11/12/18   1107  11/08/18   1443  12/02/16   0758  12/01/16   0643   11/28/16   1104  11/11/14   1055   NA  137  138  140  138   < >  141  136   POTASSIUM  4.2  3.8  3.7  3.8   < >  3.6  3.8   CHLORIDE  106  105  108  107   < >  108  105   CO2  24  24  23  22   < >  23  26   ANIONGAP  7  8  10  9   < >  10  5   GLC  78  90  90  90   < >  88  90   BUN  23  26  8  6*   < >  7  9   CR  1.86*  2.09*  0.80  0.73   < >  0.70  0.71   GFRESTIMATED  28*  25*  76  85   < >  89  88   GFRESTBLACK  34*  30*  >90   GFR Calc    >90   GFR Calc     < >  " >90   GFR Calc    >90   GFR Calc     ARCADIO  8.8  8.6  8.6  8.5   < >  8.9  8.7   PHOS  3.9  3.6   --    --    --    --    --    PROTTOTAL   --    --    --    --    --   8.4  8.8   ALBUMIN  3.4  3.2*   --    --    --   3.4  3.5   BILITOTAL   --    --    --    --    --   0.5  0.4   ALKPHOS   --    --    --    --    --   111  151*   AST   --    --    --    --    --   27  21   ALT   --    --    --    --    --   18  17    < > = values in this interval not displayed.     CBC  Recent Labs   Lab Test  11/08/18   1443  12/02/16   0758  11/30/16   1959  11/30/16   0617   HGB  8.4*  11.4*  10.8*  10.4*   WBC  6.1  5.8  5.5  4.5   RBC  2.85*  3.85  3.61*  3.60*   HCT  26.3*  34.1*  32.5*  32.1*   MCV  92  89  90  89   MCH  29.5  29.6  29.9  28.9   MCHC  31.9  33.4  33.2  32.4   RDW  13.0  14.4  14.3  14.3   PLT  153  162  160  141*     INR  Recent Labs   Lab Test  12/02/16   0758  12/01/16   0643  11/30/16   0617  11/29/16   1830   INR  2.06*  2.52*  3.00*  2.65*     ABG  No lab results found.   URINE STUDIES  Recent Labs   Lab Test  11/08/18   1500  09/01/15   1225  11/11/14   1205   COLOR  Yellow  Yellow  Light Yellow   APPEARANCE  Clear  Clear  Clear   URINEGLC  Negative  Negative  Negative   URINEBILI  Negative  Negative  Negative   URINEKETONE  Negative  Negative  Negative   SG  1.006  1.009  1.006   UBLD  Large*  Negative  Negative   URINEPH  6.0  5.0  7.0   PROTEIN  100*  Negative  Negative   NITRITE  Negative  Negative  Negative   LEUKEST  Trace*  Small*  Negative   RBCU  150*  <1  1   WBCU  8*  <1  1     Recent Labs   Lab Test  11/08/18   1500   UTPG  2.00*     PTH  No lab results found.  IRON STUDIES   No lab results found.    Scribe Disclosure:   I, Larry Wiggins, am serving as a scribe to document services personally performed by Jn Winkler MD at this visit, based upon the provider's statements to me. All documentation has been reviewed by the aforementioned provider prior  to being entered into the official medical record.     Portions of this medical record were completed by a scribe. UPON MY REVIEW AND AUTHENTICATION BY ELECTRONIC SIGNATURE, this confirms (a) I performed the applicable clinical services, and (b) the record is accurate.    Total time spent was >40 minutes, and more than 50% of face to face time was spent in counseling and/or coordination of care regarding principles of multidisciplinary care, medication management, and chronic kidney disease education.  Jn Winkler MD

## 2018-11-12 NOTE — PROGRESS NOTES
Nephrology Follow-up  2018      Jn Winkler MD  2018     Name: Josey Franco  MRN: [id number]  Age: 53 year old  : 1965  Referring provider: Edgardo Steele     Assessment and Plan:  1. Acute/Subacute Kidney Injury: Baseline Cr ~0.8 mg/dL with recent rise to 2.4 mg/dL though is much lower today at 1.8 mg/dL.  She has microscopic hematuria and albuminuria (1260 mg/g today).  Certainly, her kidney function may have declined from decreased renal perfusion/ischemia from volume depletion and lower blood pressures (102/70 today).  However, ongoing microscopic hematuria, albuminuria and elevated creatinine raises concern for active glomerulonephritis.  At this point, ANCA or IgA - related proliferative glomerulonephritis is high on the differential.  - Patient has deferred on biopsy and further treatments at this time, is electing to continue with plans of traveling home, waiting and seeing, and further discussing biopsy and treatments with her family.   - We will plan on completing weekly laboratory studies to evaluate her renal function. Patient is agreeable to canceling her trip should her renal functions continue to worsen.   - I discussed the potential for irreparable damage to the kidneys and the possibility of requiring dialysis should continued renal damage occur.   - I also informed and educated the patient of dialysis.  - There is no evidence of infection at this time per urinalysis completed one week ago.  - She voiced understanding of all risks involved with deferring on biopsy and further treatments for the time being  - Fortunately, her renal function is improving suggesting that much of her rise in Cr may have been due to a hemodynamic insult but underlying glomerular disorder seems likely though may not be as active as I initially thought  - RTC in 3 months    2. BP/Volume: BP higher today at 116/81 as compared to her last evaluation. Asymptomatic and euvolemic on  exam.  Metoprolol XL recently increased.    -no changes made today but would favor SBP closer to 120 to allow for renal perfusion  -otherwise, continue metoprolol XL and digoxin as prescribed by cardiology for now though will consider decreasing if SBP consistently ~100, especially if kidney function continues to decline.    -f/u with cardiology     3. Anemia: HGB of 8.4 on 11/08/18 with a baseline of around 10-11. Asymptomatic. No historical features to suggest acute blood loss. She is on coumadin. No daily asprin. May be related to decreased EPO production from renal insufficiency.  -paraproteinemic work-up unremarkable  -would like to get iron studies  -will consider EMMA at her upcoming clinic visit     4. Rheumatic heart disease s/p bioprosthetic MV and TV, HFpEF, atrial fibrillation, heart block s/p RV pacemaker: No new issues. On Coumadin therapy. Last INR 2.7. Recently seen in cardiology clinic.  She continues on digoxin and metoprolol Xl that was recently increased (see problem 2).       5. Hypothyroidism:  On levothyroxine (25 mcg daily).  She did not tolerate recent increase in dose.    -f/u with PCP    Follow-up: Return in about 3 months (around 2/12/2019).     HPI:   History obtained with the use of an in person interpretor.     Josey Franco is a 53 year old female with a history of rheumatic heart disease, is status-post bioprosthetic MV and TV, HFpEF, atrial fibrillation (on coumadin), heart block status-post RV pacemaker, cholelithiasis, and hypothyroidism who presents for follow-up of acute/subacute kidney injury. I last evaluated this patient on 11/08/18, at which time she reported a creatinine of 1.24 in august following vague complains of nausea and a bitter taste in her mouth. Renal ultrasound was negative at that time. She had been feeling relatively well the days leading up to her last appointment with myself without any abdominal pain, fevers, flank pain, back pain, or difficulty with  urination. Today, patient reports things have gone well since her last evaluation. She has not noted any difficulty in urination. Of note, she is planning on traveling home for a long period of time.     Review of Systems:   Pertinent items are noted in HPI or as below, remainder of complete ROS is negative.      Active Medications:   Current Outpatient Prescriptions:      Acetaminophen (TYLENOL PO), Take 325 mg by mouth every 6 hours as needed for mild pain or fever, Disp: , Rfl:      albuterol (PROAIR HFA, PROVENTIL HFA, VENTOLIN HFA) 108 (90 BASE) MCG/ACT inhaler, Inhale 2 puffs into the lungs, Disp: , Rfl:      ALPRAZolam (XANAX PO), Take 0.5-1 mg by mouth daily as needed for anxiety, Disp: , Rfl:      cholecalciferol (VITAMIN D3) 37970 UNITS capsule, Take 50,000 Units by mouth, Disp: , Rfl:      diclofenac (VOLTAREN) 1 % GEL, , Disp: , Rfl:      diltiazem HCl COATED BEADS 240 MG TB24, Take 240 mg by mouth, Disp: , Rfl:      levofloxacin (LEVAQUIN) 750 MG tablet, Take 1 tablet (750 mg) by mouth daily, Disp: 5 tablet, Rfl: 0     levothyroxine (SYNTHROID) 25 mcg/mL, Take 25 mcg by mouth daily, Disp: , Rfl:      meclizine (ANTIVERT) 25 MG tablet, Take 1 tablet (25 mg) by mouth 3 times daily as needed for dizziness (For vertigo), Disp: 30 tablet, Rfl: 0     Multiple Vitamin (DAILY ISMAEL PO), Take by mouth daily, Disp: , Rfl:      ondansetron (ZOFRAN-ODT) 4 MG disintegrating tablet, Take by mouth every 8 hours as needed for nausea, Disp: , Rfl:      pantoprazole (PROTONIX) 40 MG EC tablet, Take 1 tablet (40 mg) by mouth daily (Patient not taking: Reported on 11/8/2018), Disp: 30 tablet, Rfl: 0     SERTRALINE HCL PO, Take 100 mg by mouth daily, Disp: , Rfl:      TRAZODONE HCL PO, Take 50 mg by mouth nightly as needed for sleep, Disp: , Rfl:      ursodiol (ACTIGALL) 300 MG capsule, Take 300 mg by mouth, Disp: , Rfl:      Warfarin Sodium (JANTOVEN PO), Take 1 mg by mouth daily 11/28/16: 1 mg Mon/Wed/Fri; 0.5 mg daily  "rest of week, Disp: , Rfl:      Allergies:   No known drug allergies      Past Medical History:  Congestive heart failure  Depression  Anxiety  PTSD  Rheumatic heart disease  Atrial fibrillation  Chronic cholecystitis  Heart Failure  Atrioventricular block  Mitral stenosis     Past Surgical History:  Pacemaker placement  Dilation and curettage  Mitral valve replacement  Hysteroscopy  Tricuspid valve replacement     Family History:   Family history is unknown to the patient      Social History:     Never smoker. Does not consume alcohol.     Reviewed with patient in exam room.     Physical Exam:  /81  Pulse 80  Temp 97.2  F (36.2  C) (Oral)  Ht 1.657 m (5' 5.25\")  Wt 80 kg (176 lb 6.4 oz)  SpO2 100%  BMI 29.13 kg/m2   [unfilled]   Admit      GENERAL APPEARANCE: NAD  EYES: no scleral icterus, pupils equal  Endo: no moon facies  Lymphatics: no cervical or supraclavicular LAD  Pulmonary: lungs clear to auscultation with equal breath sounds bilaterally  CV: regular rhythm, normal rate, no rub   - No JVD    - No edema *  GI: soft, nontender, nondistended  MS: no evidence of inflammation in joints, no muscle tenderness  : No CVA tendereness  SKIN: no concerning rash  NEURO: no focal deficit    Laboratory:  CMP  Recent Labs   Lab Test  11/12/18   1107  11/08/18   1443  12/02/16   0758  12/01/16   0643   11/28/16   1104  11/11/14   1055   NA  137  138  140  138   < >  141  136   POTASSIUM  4.2  3.8  3.7  3.8   < >  3.6  3.8   CHLORIDE  106  105  108  107   < >  108  105   CO2  24  24  23  22   < >  23  26   ANIONGAP  7  8  10  9   < >  10  5   GLC  78  90  90  90   < >  88  90   BUN  23  26  8  6*   < >  7  9   CR  1.86*  2.09*  0.80  0.73   < >  0.70  0.71   GFRESTIMATED  28*  25*  76  85   < >  89  88   GFRESTBLACK  34*  30*  >90   GFR Calc    >90   GFR Calc     < >  >90   GFR Calc    >90   GFR Calc     ARCADIO  8.8  8.6  8.6  8.5   < >  8.9  " 8.7   PHOS  3.9  3.6   --    --    --    --    --    PROTTOTAL   --    --    --    --    --   8.4  8.8   ALBUMIN  3.4  3.2*   --    --    --   3.4  3.5   BILITOTAL   --    --    --    --    --   0.5  0.4   ALKPHOS   --    --    --    --    --   111  151*   AST   --    --    --    --    --   27  21   ALT   --    --    --    --    --   18  17    < > = values in this interval not displayed.     CBC  Recent Labs   Lab Test  11/08/18   1443  12/02/16   0758  11/30/16   1959  11/30/16   0617   HGB  8.4*  11.4*  10.8*  10.4*   WBC  6.1  5.8  5.5  4.5   RBC  2.85*  3.85  3.61*  3.60*   HCT  26.3*  34.1*  32.5*  32.1*   MCV  92  89  90  89   MCH  29.5  29.6  29.9  28.9   MCHC  31.9  33.4  33.2  32.4   RDW  13.0  14.4  14.3  14.3   PLT  153  162  160  141*     INR  Recent Labs   Lab Test  12/02/16   0758  12/01/16   0643  11/30/16   0617  11/29/16   1830   INR  2.06*  2.52*  3.00*  2.65*     ABG  No lab results found.   URINE STUDIES  Recent Labs   Lab Test  11/08/18   1500  09/01/15   1225  11/11/14   1205   COLOR  Yellow  Yellow  Light Yellow   APPEARANCE  Clear  Clear  Clear   URINEGLC  Negative  Negative  Negative   URINEBILI  Negative  Negative  Negative   URINEKETONE  Negative  Negative  Negative   SG  1.006  1.009  1.006   UBLD  Large*  Negative  Negative   URINEPH  6.0  5.0  7.0   PROTEIN  100*  Negative  Negative   NITRITE  Negative  Negative  Negative   LEUKEST  Trace*  Small*  Negative   RBCU  150*  <1  1   WBCU  8*  <1  1     Recent Labs   Lab Test  11/08/18   1500   UTPG  2.00*     PTH  No lab results found.  IRON STUDIES   No lab results found.    Scribe Disclosure:   I, Larry Wiggins, am serving as a scribe to document services personally performed by Jn Winkler MD at this visit, based upon the provider's statements to me. All documentation has been reviewed by the aforementioned provider prior to being entered into the official medical record.     Portions of this medical record were completed by a  scribe. UPON MY REVIEW AND AUTHENTICATION BY ELECTRONIC SIGNATURE, this confirms (a) I performed the applicable clinical services, and (b) the record is accurate.    Total time spent was >40 minutes, and more than 50% of face to face time was spent in counseling and/or coordination of care regarding principles of multidisciplinary care, medication management, and chronic kidney disease education.  Jn Winkler MD

## 2018-11-12 NOTE — LETTER
2018       RE: Josey Franco  [address]  [address]  [facility] [address]     Dear Colleague,    Thank you for referring your patient, Josey Franco, to the [facility] NEPHROLOGY at [facility]. Please see a copy of my visit note below.      Nephrology Follow-up  2018      Jn Winkler MD  2018     Name: Josey Franco  MRN: [id number]  Age: 53 year old  : 1965  Referring provider: Edgardo Steele     Assessment and Plan:  1. Acute/Subacute Kidney Injury: Baseline Cr ~0.8 mg/dL with recent rise to 2.4 mg/dL though is much lower today at 1.8 mg/dL.  She has microscopic hematuria and albuminuria (1260 mg/g today).  Certainly, her kidney function may have declined from decreased renal perfusion/ischemia from volume depletion and lower blood pressures (102/70 today).  However, ongoing microscopic hematuria, albuminuria and elevated creatinine raises concern for active glomerulonephritis.  At this point, ANCA or IgA - related proliferative glomerulonephritis is high on the differential.  - Patient has deferred on biopsy and further treatments at this time, is electing to continue with plans of traveling home, waiting and seeing, and further discussing biopsy and treatments with her family.   - We will plan on completing weekly laboratory studies to evaluate her renal function. Patient is agreeable to canceling her trip should her renal functions continue to worsen.   - I discussed the potential for irreparable damage to the kidneys and the possibility of requiring dialysis should continued renal damage occur.   - I also informed and educated the patient of dialysis.  - There is no evidence of infection at this time per urinalysis completed one week ago.  - She voiced understanding of all risks involved with deferring on biopsy and further treatments for the time being  - Fortunately, her renal function is improving suggesting that  much of her rise in Cr may have been due to a hemodynamic insult but underlying glomerular disorder seems likely though may not be as active as I initially thought  - RTC in 3 months    2. BP/Volume: BP  higher today at 116/81 as compared to her last evaluation. Asymptomatic and euvolemic on exam.  Metoprolol XL recently increased.    -no changes made today but would favor SBP closer to 120 to allow for renal perfusion  -otherwise, continue metoprolol XL and digoxin as prescribed by cardiology for now though will consider decreasing if SBP consistently ~100, especially if kidney function continues to decline.    -f/u with cardiology     3. Anemia: HGB of 8.4 on 11/08/18 with a baseline of around 10-11. Asymptomatic. No historical features to suggest acute blood loss. She is on coumadin. No daily asprin. May be related to decreased EPO production from renal insufficiency.  -paraproteinemic work-up unremarkable  -would like to get iron studies  -will consider EMMA at her upcoming clinic visit     4. Rheumatic heart disease s/p bioprosthetic MV and TV, HFpEF, atrial fibrillation, heart block s/p RV pacemaker: No new issues. On Coumadin therapy. Last INR 2.7. Recently seen in cardiology clinic.  She continues on digoxin and metoprolol Xl that was recently increased (see problem 2).       5. Hypothyroidism:  On levothyroxine (25 mcg daily).  She did not tolerate recent increase in dose.    -f/u with PCP    Follow-up: Return in about 3 months (around 2/12/2019).     HPI:   History obtained with the use of an in person interpretor.     Josey Franco is a 53 year old female with a history of rheumatic heart disease, is status-post bioprosthetic MV and TV, HFpEF, atrial fibrillation (on coumadin), heart block status-post RV pacemaker, cholelithiasis, and hypothyroidism who presents for follow-up of acute/subacute kidney injury. I last evaluated this patient on 11/08/18, at which time she reported a creatinine of 1.24 in  august following vague complains of nausea and a bitter taste in her mouth. Renal ultrasound was negative at that time. She had been feeling relatively well the days leading up to her last appointment with myself without any abdominal pain, fevers, flank pain, back pain, or difficulty with urination. Today, patient reports things have gone well since her last evaluation. She has not noted any difficulty in urination. Of note, she is planning on traveling home for a long period of time.     Review of Systems:   Pertinent items are noted in HPI or as below, remainder of complete ROS is negative.      Active Medications:   Current Outpatient Prescriptions:      Acetaminophen (TYLENOL PO), Take 325 mg by mouth every 6 hours as needed for mild pain or fever, Disp: , Rfl:      albuterol (PROAIR HFA, PROVENTIL HFA, VENTOLIN HFA) 108 (90 BASE) MCG/ACT inhaler, Inhale 2 puffs into the lungs, Disp: , Rfl:      ALPRAZolam (XANAX PO), Take 0.5-1 mg by mouth daily as needed for anxiety, Disp: , Rfl:      cholecalciferol (VITAMIN D3) 37370 UNITS capsule, Take 50,000 Units by mouth, Disp: , Rfl:      diclofenac (VOLTAREN) 1 % GEL, , Disp: , Rfl:      diltiazem HCl COATED BEADS 240 MG TB24, Take 240 mg by mouth, Disp: , Rfl:      levofloxacin (LEVAQUIN) 750 MG tablet, Take 1 tablet (750 mg) by mouth daily, Disp: 5 tablet, Rfl: 0     levothyroxine (SYNTHROID) 25 mcg/mL, Take 25 mcg by mouth daily, Disp: , Rfl:      meclizine (ANTIVERT) 25 MG tablet, Take 1 tablet (25 mg) by mouth 3 times daily as needed for dizziness (For vertigo), Disp: 30 tablet, Rfl: 0     Multiple Vitamin (DAILY ISMAEL PO), Take by mouth daily, Disp: , Rfl:      ondansetron (ZOFRAN-ODT) 4 MG disintegrating tablet, Take by mouth every 8 hours as needed for nausea, Disp: , Rfl:      pantoprazole (PROTONIX) 40 MG EC tablet, Take 1 tablet (40 mg) by mouth daily (Patient not taking: Reported on 11/8/2018), Disp: 30 tablet, Rfl: 0     SERTRALINE HCL PO, Take 100 mg by  "mouth daily, Disp: , Rfl:      TRAZODONE HCL PO, Take 50 mg by mouth nightly as needed for sleep, Disp: , Rfl:      ursodiol (ACTIGALL) 300 MG capsule, Take 300 mg by mouth, Disp: , Rfl:      Warfarin Sodium (JANTOVEN PO), Take 1 mg by mouth daily 11/28/16: 1 mg Mon/Wed/Fri; 0.5 mg daily rest of week, Disp: , Rfl:      Allergies:   No known drug allergies      Past Medical History:  Congestive heart failure  Depression  Anxiety  PTSD  Rheumatic heart disease  Atrial fibrillation  Chronic cholecystitis  Heart Failure  Atrioventricular block  Mitral stenosis     Past Surgical History:  Pacemaker placement  Dilation and curettage  Mitral valve replacement  Hysteroscopy  Tricuspid valve replacement     Family History:   Family history is unknown to the patient      Social History:     Never smoker. Does not consume alcohol.     Reviewed with patient in exam room.     Physical Exam:  /81  Pulse 80  Temp 97.2  F (36.2  C) (Oral)  Ht 1.657 m (5' 5.25\")  Wt 80 kg (176 lb 6.4 oz)  SpO2 100%  BMI 29.13 kg/m2   [unfilled]   Admit      GENERAL APPEARANCE: NAD  EYES: no scleral icterus, pupils equal  Endo: no moon facies  Lymphatics: no cervical or supraclavicular LAD  Pulmonary: lungs clear to auscultation with equal breath sounds bilaterally  CV: regular rhythm, normal rate, no rub   - No JVD    - No edema *  GI: soft, nontender, nondistended  MS: no evidence of inflammation in joints, no muscle tenderness  : No CVA tendereness  SKIN: no concerning rash  NEURO: no focal deficit    Laboratory:  CMP  Recent Labs   Lab Test  11/12/18   1107  11/08/18   1443  12/02/16   0758  12/01/16   0643   11/28/16   1104  11/11/14   1055   NA  137  138  140  138   < >  141  136   POTASSIUM  4.2  3.8  3.7  3.8   < >  3.6  3.8   CHLORIDE  106  105  108  107   < >  108  105   CO2  24  24  23  22   < >  23  26   ANIONGAP  7  8  10  9   < >  10  5   GLC  78  90  90  90   < >  88  90   BUN  23  26  8  6*   < >  7  9   CR  " 1.86*  2.09*  0.80  0.73   < >  0.70  0.71   GFRESTIMATED  28*  25*  76  85   < >  89  88   GFRESTBLACK  34*  30*  >90   GFR Calc    >90   GFR Calc     < >  >90   GFR Calc    >90   GFR Calc     ARCADIO  8.8  8.6  8.6  8.5   < >  8.9  8.7   PHOS  3.9  3.6   --    --    --    --    --    PROTTOTAL   --    --    --    --    --   8.4  8.8   ALBUMIN  3.4  3.2*   --    --    --   3.4  3.5   BILITOTAL   --    --    --    --    --   0.5  0.4   ALKPHOS   --    --    --    --    --   111  151*   AST   --    --    --    --    --   27  21   ALT   --    --    --    --    --   18  17    < > = values in this interval not displayed.     CBC  Recent Labs   Lab Test  11/08/18   1443  12/02/16   0758  11/30/16   1959  11/30/16   0617   HGB  8.4*  11.4*  10.8*  10.4*   WBC  6.1  5.8  5.5  4.5   RBC  2.85*  3.85  3.61*  3.60*   HCT  26.3*  34.1*  32.5*  32.1*   MCV  92  89  90  89   MCH  29.5  29.6  29.9  28.9   MCHC  31.9  33.4  33.2  32.4   RDW  13.0  14.4  14.3  14.3   PLT  153  162  160  141*     INR  Recent Labs   Lab Test  12/02/16   0758  12/01/16   0643  11/30/16   0617  11/29/16   1830   INR  2.06*  2.52*  3.00*  2.65*     ABG  No lab results found.   URINE STUDIES  Recent Labs   Lab Test  11/08/18   1500  09/01/15   1225  11/11/14   1205   COLOR  Yellow  Yellow  Light Yellow   APPEARANCE  Clear  Clear  Clear   URINEGLC  Negative  Negative  Negative   URINEBILI  Negative  Negative  Negative   URINEKETONE  Negative  Negative  Negative   SG  1.006  1.009  1.006   UBLD  Large*  Negative  Negative   URINEPH  6.0  5.0  7.0   PROTEIN  100*  Negative  Negative   NITRITE  Negative  Negative  Negative   LEUKEST  Trace*  Small*  Negative   RBCU  150*  <1  1   WBCU  8*  <1  1     Recent Labs   Lab Test  11/08/18   1500   UTPG  2.00*     PTH  No lab results found.  IRON STUDIES   No lab results found.    Scribe Disclosure:   Larry MCCRAY, am serving as a scribe to document  services personally performed by Jn Winkler MD at this visit, based upon the provider's statements to me. All documentation has been reviewed by the aforementioned provider prior to being entered into the official medical record.     Portions of this medical record were completed by a scribe. UPON MY REVIEW AND AUTHENTICATION BY ELECTRONIC SIGNATURE, this confirms (a) I performed the applicable clinical services, and (b) the record is accurate.    Total time spent was >40 minutes, and more than 50% of face to face time was spent in counseling and/or coordination of care regarding principles of multidisciplinary care, medication management, and chronic kidney disease education.  Jn Winkler MD

## 2018-11-12 NOTE — MR AVS SNAPSHOT
After Visit Summary   11/12/2018    Josey Franco    MRN: [id number]           Patient Information     Date Of Birth          1965        Visit Information        Provider Department      11/12/2018 10:45 AM Meseret Hudson; Jn Winkler MD [facility] Nephrology        Today's Diagnoses     SILVIA (acute kidney injury) (H)    -  1       Follow-ups after your visit        Follow-up notes from your care team     Return in about 3 months (around 2/12/2019).      Your next 10 appointments already scheduled     Dec 27, 2018 12:00 PM CST   Lab with UC LAB   [facility] ([facility])    [address]  1st Floor  [facility] [address] [phone]            Dec 27, 2018  1:00 PM CST   (Arrive by 12:30 PM)   Return Visit with Jn Winkler MD   [facility]phrology ([facility])    [address] [place] [address]  [address]  [facility] [id number] [phone]            Feb 25, 2019  7:30 AM CST   Lab with UC LAB   [facility] ([facility])    [address] [place] [address]  1st Floor  [facility] [address] [phone]            Feb 25, 2019  8:30 AM CST   (Arrive by 8:00 AM)   Return Visit with Jn Winkler MD   [facility]ph[facility] ([facility])    [address] [place] [address]  [address]  [facility] [address] [phone]              Future tests that were ordered for you today     Open Standing Orders        Priority Remaining Interval Expires Ordered    Renal panel Routine 21/21 11/12/2019 11/12/2018            Who to contact     If you have questions or need follow up information about today's clinic visit or your schedule please contact [facility] NEPHROLOGY directly at [phone].  Normal or non-critical lab and imaging results will be communicated to you by MyChart, letter or phone within 4 business days after the clinic has received the results. If you do not hear  "from us within 7 days, please contact the clinic through [employer] or phone. If you have a critical or abnormal lab result, we will notify you by phone as soon as possible.  Submit refill requests through [employer] or call your pharmacy and they will forward the refill request to us. Please allow 3 business days for your refill to be completed.          Additional Information About Your Visit        [employer] Information     [employer] lets you send messages to your doctor, view your test results, renew your prescriptions, schedule appointments and more. To sign up, go to [email or web address] . Click on \"Log in\" on the left side of the screen, which will take you to the Welcome page. Then click on \"Sign up Now\" on the right side of the page.     You will be asked to enter the access code listed below, as well as some personal information. Please follow the directions to create your username and password.     Your access code is: [email or web address]  Expires: 2/3/2019  6:30 AM     Your access code will  in 90 days. If you need help or a new code, please call your [facility] clinic or [phone].        Care EveryWhere ID     This is your Care EveryWhere ID. This could be used by other organizations to access your [facility] medical records  [id number]        Your Vitals Were     Pulse Temperature Height Pulse Oximetry BMI (Body Mass Index)       80 97.2  F (36.2  C) (Oral) 1.657 m (5' 5.25\") 100% 29.13 kg/m2        Blood Pressure from Last 3 Encounters:   18 116/81   18 102/70   16 119/77    Weight from Last 3 Encounters:   18 80 kg (176 lb 6.4 oz)   18 80.5 kg (177 lb 6.4 oz)   16 83.9 kg (185 lb)               Primary Care Provider Office Phone # Fax #    Edgardo Steele [phone]       [facility] [address] [place] [address]        Equal Access to Services     DEBRA THOMPSON AH: rayna Dover qaybjudy alexander" bouchra guzmanisabel frandyyayo boboaan ah. So [facility] [phone].    ATENCIÓN: Si olliela shanna, tiene a shane disposición servicios gratuitos de asistencia lingüística. Savanna al [phone].    We comply with applicable federal civil rights laws and [place] laws. We do not discriminate on the basis of race, color, national origin, age, disability, sex, sexual orientation, or gender identity.            Thank you!     Thank you for choosing [facility] NEPHROLOGY  for your care. Our goal is always to provide you with excellent care. Hearing back from our patients is one way we can continue to improve our services. Please take a few minutes to complete the written survey that you may receive in the mail after your visit with us. Thank you!             Your Updated Medication List - Protect others around you: Learn how to safely use, store and throw away your medicines at www.disposemymeds.org.          This list is accurate as of 11/12/18 12:41 PM.  Always use your most recent med list.                   Brand Name Dispense Instructions for use Diagnosis    acetaminophen-codeine 300-30 MG per tablet    TYLENOL w/CODEINE No. 3    10 tablet    Take 1 tablet by mouth every 6 hours as needed for mild pain    Post-op pain       albuterol 108 (90 Base) MCG/ACT inhaler    PROAIR HFA/PROVENTIL HFA/VENTOLIN HFA     Inhale 2 puffs into the lungs        cholecalciferol 00055 units capsule    VITAMIN D3     Take 50,000 Units by mouth        DAILY ISMAEL PO      Take by mouth daily        diclofenac 1 % Gel topical gel    VOLTAREN          diltiazem HCl COATED BEADS 240 MG Tb24      Take 240 mg by mouth        JANTOVEN PO      Take 1 mg by mouth daily 11/28/16: 1 mg Mon/Wed/Fri; 0.5 mg daily rest of week        levofloxacin 750 MG tablet    LEVAQUIN    5 tablet    Take 1 tablet (750 mg) by mouth daily    Positive culture findings in sputum       levothyroxine 25 mcg/mL Susp    SYNTHROID     Take 25 mcg by mouth daily         meclizine 25 MG tablet    ANTIVERT    30 tablet    Take 1 tablet (25 mg) by mouth 3 times daily as needed for dizziness (For vertigo)    Vertigo       metoprolol succinate 200 MG 24 hr tablet    TOPROL-XL          ondansetron 4 MG ODT tab    ZOFRAN-ODT     Take by mouth every 8 hours as needed for nausea        pantoprazole 40 MG EC tablet    PROTONIX    30 tablet    Take 1 tablet (40 mg) by mouth daily    Hemoptysis       TRAZODONE HCL PO      Take 50 mg by mouth nightly as needed for sleep        TYLENOL PO      Take 325 mg by mouth every 6 hours as needed for mild pain or fever        XANAX PO      Take 0.5-1 mg by mouth daily as needed for anxiety

## 2018-11-12 NOTE — NURSING NOTE
Chief Complaint   Patient presents with     RECHECK     Proteinuria and Subacute Kidney Failure   Pt roomed, vitals, meds, and allergies reviewed with pt. Pt ready for provider.  Carlos Davis, CMA

## 2018-11-27 ENCOUNTER — TELEPHONE (OUTPATIENT)
Dept: NEPHROLOGY | Facility: CLINIC | Age: 53
End: 2018-11-27

## 2018-11-27 DIAGNOSIS — N17.9 AKI (ACUTE KIDNEY INJURY) (H): ICD-10-CM

## 2018-11-27 PROCEDURE — 80069 RENAL FUNCTION PANEL: CPT | Performed by: INTERNAL MEDICINE

## 2018-11-27 PROCEDURE — 36415 COLL VENOUS BLD VENIPUNCTURE: CPT | Performed by: INTERNAL MEDICINE

## 2018-11-27 NOTE — TELEPHONE ENCOUNTER
Call to patient with assistance of  services regarding lab follow up per Dr. Winkler's instruction. Standing order already in place. Patient states she will try to have lab drawn today or tomorrow. Will watch for results.   Ann Lu LPN  Nephrology  [phone]

## 2018-11-29 LAB
ALBUMIN SERPL-MCNC: 3.6 G/DL (ref 3.4–5)
ANION GAP SERPL CALCULATED.3IONS-SCNC: 6 MMOL/L (ref 3–14)
BUN SERPL-MCNC: 23 MG/DL (ref 7–30)
CALCIUM SERPL-MCNC: 8.9 MG/DL (ref 8.5–10.1)
CHLORIDE SERPL-SCNC: 104 MMOL/L (ref 94–109)
CO2 SERPL-SCNC: 27 MMOL/L (ref 20–32)
CREAT SERPL-MCNC: 1.43 MG/DL (ref 0.52–1.04)
GFR SERPL CREATININE-BSD FRML MDRD: 38 ML/MIN/1.7M2
GLUCOSE SERPL-MCNC: 84 MG/DL (ref 70–99)
PHOSPHATE SERPL-MCNC: 3.9 MG/DL (ref 2.5–4.5)
POTASSIUM SERPL-SCNC: 4 MMOL/L (ref 3.4–5.3)
SODIUM SERPL-SCNC: 137 MMOL/L (ref 133–144)

## 2018-11-30 ENCOUNTER — TELEPHONE (OUTPATIENT)
Dept: NEPHROLOGY | Facility: CLINIC | Age: 53
End: 2018-11-30

## 2018-11-30 NOTE — TELEPHONE ENCOUNTER
Call to patient regarding lab results using  services. Left VM, provided number for call back.  Ann Lu LPN  Nephrology  [phone]

## 2018-12-14 ENCOUNTER — TELEPHONE (OUTPATIENT)
Dept: NEPHROLOGY | Facility: CLINIC | Age: 53
End: 2018-12-14

## 2018-12-14 NOTE — TELEPHONE ENCOUNTER
Call to patient using  services to remind her to get labs drawn next week. Left VM. Provided number for call back.  Ann Lu LPN  Nephrology  [phone]

## 2018-12-18 DIAGNOSIS — N17.9 ACUTE KIDNEY INJURY (H): Primary | ICD-10-CM

## 2018-12-19 DIAGNOSIS — N17.9 ACUTE KIDNEY INJURY (H): ICD-10-CM

## 2018-12-19 LAB
HGB BLD-MCNC: 8.9 G/DL (ref 11.7–15.7)
PROT UR-MCNC: 0.62 G/L
PROT/CREAT 24H UR: 1.24 G/G CR (ref 0–0.2)

## 2018-12-19 PROCEDURE — 84156 ASSAY OF PROTEIN URINE: CPT | Performed by: INTERNAL MEDICINE

## 2018-12-19 PROCEDURE — 36415 COLL VENOUS BLD VENIPUNCTURE: CPT | Performed by: INTERNAL MEDICINE

## 2018-12-19 PROCEDURE — 80069 RENAL FUNCTION PANEL: CPT | Performed by: INTERNAL MEDICINE

## 2018-12-19 PROCEDURE — 82043 UR ALBUMIN QUANTITATIVE: CPT | Performed by: INTERNAL MEDICINE

## 2018-12-19 PROCEDURE — 85018 HEMOGLOBIN: CPT | Performed by: INTERNAL MEDICINE

## 2018-12-20 LAB
ALBUMIN SERPL-MCNC: 3.4 G/DL (ref 3.4–5)
ANION GAP SERPL CALCULATED.3IONS-SCNC: 8 MMOL/L (ref 3–14)
BUN SERPL-MCNC: 21 MG/DL (ref 7–30)
CALCIUM SERPL-MCNC: 9.2 MG/DL (ref 8.5–10.1)
CHLORIDE SERPL-SCNC: 105 MMOL/L (ref 94–109)
CO2 SERPL-SCNC: 25 MMOL/L (ref 20–32)
CREAT SERPL-MCNC: 1.41 MG/DL (ref 0.52–1.04)
CREAT UR-MCNC: 51 MG/DL
GFR SERPL CREATININE-BSD FRML MDRD: 42 ML/MIN/{1.73_M2}
GLUCOSE SERPL-MCNC: 86 MG/DL (ref 70–99)
MICROALBUMIN UR-MCNC: 369 MG/L
MICROALBUMIN/CREAT UR: 723.53 MG/G CR (ref 0–25)
PHOSPHATE SERPL-MCNC: 4 MG/DL (ref 2.5–4.5)
POTASSIUM SERPL-SCNC: 3.8 MMOL/L (ref 3.4–5.3)
SODIUM SERPL-SCNC: 138 MMOL/L (ref 133–144)

## 2018-12-27 ENCOUNTER — OFFICE VISIT (OUTPATIENT)
Dept: NEPHROLOGY | Facility: CLINIC | Age: 53
End: 2018-12-27
Attending: INTERNAL MEDICINE
Payer: MEDICAID

## 2018-12-27 VITALS
RESPIRATION RATE: 16 BRPM | BODY MASS INDEX: 29.49 KG/M2 | WEIGHT: 177 LBS | HEART RATE: 81 BPM | OXYGEN SATURATION: 99 % | DIASTOLIC BLOOD PRESSURE: 71 MMHG | HEIGHT: 65 IN | SYSTOLIC BLOOD PRESSURE: 106 MMHG

## 2018-12-27 DIAGNOSIS — N17.9 AKI (ACUTE KIDNEY INJURY) (H): Primary | ICD-10-CM

## 2018-12-27 DIAGNOSIS — D64.9 ANEMIA, UNSPECIFIED TYPE: ICD-10-CM

## 2018-12-27 DIAGNOSIS — N17.9 AKI (ACUTE KIDNEY INJURY) (H): ICD-10-CM

## 2018-12-27 LAB
ALBUMIN SERPL-MCNC: 3.4 G/DL (ref 3.4–5)
ANION GAP SERPL CALCULATED.3IONS-SCNC: 6 MMOL/L (ref 3–14)
BUN SERPL-MCNC: 13 MG/DL (ref 7–30)
CALCIUM SERPL-MCNC: 8.4 MG/DL (ref 8.5–10.1)
CHLORIDE SERPL-SCNC: 105 MMOL/L (ref 94–109)
CO2 SERPL-SCNC: 25 MMOL/L (ref 20–32)
CREAT SERPL-MCNC: 1.12 MG/DL (ref 0.52–1.04)
FERRITIN SERPL-MCNC: 84 NG/ML (ref 8–252)
GFR SERPL CREATININE-BSD FRML MDRD: 56 ML/MIN/{1.73_M2}
GLUCOSE SERPL-MCNC: 112 MG/DL (ref 70–99)
IRON SATN MFR SERPL: 18 % (ref 15–46)
IRON SERPL-MCNC: 53 UG/DL (ref 35–180)
PHOSPHATE SERPL-MCNC: 3 MG/DL (ref 2.5–4.5)
POTASSIUM SERPL-SCNC: 3.5 MMOL/L (ref 3.4–5.3)
SODIUM SERPL-SCNC: 136 MMOL/L (ref 133–144)
TIBC SERPL-MCNC: 295 UG/DL (ref 240–430)
VIT B12 SERPL-MCNC: 762 PG/ML (ref 193–986)

## 2018-12-27 PROCEDURE — 83550 IRON BINDING TEST: CPT | Performed by: INTERNAL MEDICINE

## 2018-12-27 PROCEDURE — 80069 RENAL FUNCTION PANEL: CPT | Performed by: INTERNAL MEDICINE

## 2018-12-27 PROCEDURE — G0463 HOSPITAL OUTPT CLINIC VISIT: HCPCS | Mod: ZF

## 2018-12-27 PROCEDURE — 82607 VITAMIN B-12: CPT | Performed by: INTERNAL MEDICINE

## 2018-12-27 PROCEDURE — 83540 ASSAY OF IRON: CPT | Performed by: INTERNAL MEDICINE

## 2018-12-27 PROCEDURE — 36415 COLL VENOUS BLD VENIPUNCTURE: CPT | Performed by: INTERNAL MEDICINE

## 2018-12-27 PROCEDURE — 82728 ASSAY OF FERRITIN: CPT | Performed by: INTERNAL MEDICINE

## 2018-12-27 RX ORDER — CHOLECALCIFEROL (VITAMIN D3) 25 MCG
1000 TABLET ORAL DAILY
Refills: 3 | COMMUNITY
Start: 2018-11-21

## 2018-12-27 ASSESSMENT — PAIN SCALES - GENERAL: PAINLEVEL: NO PAIN (0)

## 2018-12-27 ASSESSMENT — MIFFLIN-ST. JEOR: SCORE: 1417.71

## 2018-12-27 NOTE — PROGRESS NOTES
Nephrology Clinic    Jn Winkler MD  2018     Name: Josey Franco  MRN: [id number]  Age: 53 year old  : 1965  Referring provider: Edgardo Steele     Assessment and Plan:     1. Acute/Subacute Kidney Injury: Baseline Cr ~0.8 mg/dL with recent rise over the past couple months to 2.4 mg/dL though is much lower today at 1.12 mg/dL.  She has microscopic hematuria and albuminuria, though this has improved from previous (1260 down to 720 mg/g today).  Certainly, her kidney function may have declined from decreased renal perfusion/ischemia from volume depletion and lower blood pressures (106/71 today).  However, ongoing microscopic hematuria, albuminuria and elevated creatinine raises concern for an active glomerular process.  At this point, underlying ANCA negative or IgA - related proliferative glomerulonephritis remains high on the differential.  - Patient deferred renal biopsy and further treatments previously. Due to improved kidney function, will not start her on medications at this time. She is still planning trip to [facility] in 2018. Will plan to follow-up after she returns from this trip.   - During last visit, I discussed the potential for irreparable damage to the kidneys and the possibility of requiring dialysis should continued renal damage occur. Was also given dialysis education at that time.   - She voiced understanding of all risks involved with deferring on biopsy and further treatments for the time being  - Fortunately, her renal function is improving suggesting that much of her rise in serum creatinine may have been due to a hemodynamic insult but underlying glomerular disorder seems likely though may not be as active as I initially thought  - RTC in 4 months.     2. BP/Volume: BP today is 106/71, which appears to be typical of home blood pressures. Asymptomatic and euvolemic on exam. Though she was told to increase Metoprolol XL to 200 mg, she prefers to take 100  mg.   - No changes made today but would favor SBP closer to 120 to allow for renal perfusion  - Otherwise, continue metoprolol XL and digoxin as prescribed by cardiology for now, though will consider decreasing if SBP consistently ~100, especially if kidney function continues to decline.    - F/u with cardiology     3. Anemia: HGB of 8.9 on 12/19/18 with a baseline of around 10-11. This has improved slightly since last visit and I expect this to continue to improve. Asymptomatic. No historical features to suggest acute blood loss. She is on coumadin. No daily asprin. May be related to decreased EPO production from renal insufficiency.  - Paraproteinemic work-up unremarkable  - Would like to get iron studies  - Will add Vitamin B12 and RBC folate.  - Discussed dietary iron supplementation. Will not add other supplements at this time.   - Will consider EMMA at her upcoming clinic visit if hgb remains low     4. Rheumatic heart disease s/p bioprosthetic MV and TV, HFpEF, atrial fibrillation, heart block s/p RV pacemaker: No new issues. On Coumadin therapy. Last INR 2.7. Recently seen in cardiology clinic.  She continues on digoxin and metoprolol XL.       5. Hypothyroidism:  On levothyroxine (25 mcg daily).  She did not tolerate recent increase in dose.    - F/u with PCP    Follow-up: Return in about 4 months (around 4/27/2019).      was used to obtain history.     HPI:   Josey Franco is a 53 year old female with a history of with a history of rheumatic heart disease s/p bioprosthetic MV and TV, HFpEF, atrial fibrillation (on coumadin), heart block s/p RV pacemaker, cholelithiasis and hypothyroidism who presents for follow-up of subacute kidney injury. I last evaluated the patient on 11/12/2018. Renal ultrasound was negative at that time and patient did not want to move forward with other treatment/renal biopsy, as she wished to discuss findings with her family and was planning to travel home to  \[facility]\"".    She previously was having fevers and feeling ill, however since our last evaluation, she has been feeling much better. She was previously taking significant amounts of tylenol, but had not been taking ibuprofen or Advil. Currently she is not using any tylenol. Her blood pressure is typically measured every two weeks by a nurse who comes to her home, and it is usually 100/60-70. She also states she was prescribed 200 mg metoprolol by her cardiologist, however she was concerned that this was a large dose, so she only takes 100 mg daily. She does not typically eat much meat. She does note some burning sensation in her chest intermittently as well as some knee pain when she sits down. She reports her palpitations have improved since starting metoprolol, though she does have palpitations prior to taking her medications. She has been taking these daily. She denies any recent difficulty breathing, rash, fevers, cuts, bloody stools, or blood in urine. She does not have menstrual periods.     Review of Systems:   Pertinent items are noted in HPI or as below, remainder of complete ROS is negative.      Active Medications:      Acetaminophen (TYLENOL PO), Take 325 mg by mouth every 6 hours as needed for mild pain or fever, Disp: , Rfl:      acetaminophen-codeine (TYLENOL W/CODEINE NO. 3) 300-30 MG per tablet, Take 1 tablet by mouth every 6 hours as needed for mild pain, Disp: 10 tablet, Rfl: 0     albuterol (PROAIR HFA, PROVENTIL HFA, VENTOLIN HFA) 108 (90 BASE) MCG/ACT inhaler, Inhale 2 puffs into the lungs, Disp: , Rfl:      ALPRAZolam (XANAX PO), Take 0.5-1 mg by mouth daily as needed for anxiety, Disp: , Rfl:      diclofenac (VOLTAREN) 1 % GEL, , Disp: , Rfl:      diltiazem HCl COATED BEADS 240 MG TB24, Take 240 mg by mouth, Disp: , Rfl:      levothyroxine (SYNTHROID) 25 mcg/mL, Take 25 mcg by mouth daily, Disp: , Rfl:      metoprolol succinate (TOPROL-XL) 200 MG 24 hr tablet, Take 100 mg by mouth daily, Disp:  ", Rfl: 3     Multiple Vitamin (DAILY ISMAEL PO), Take by mouth daily, Disp: , Rfl:      ondansetron (ZOFRAN-ODT) 4 MG disintegrating tablet, Take by mouth every 8 hours as needed for nausea, Disp: , Rfl:      pantoprazole (PROTONIX) 40 MG EC tablet, Take 1 tablet (40 mg) by mouth daily, Disp: 30 tablet, Rfl: 0     VITAMIN D3 1000 units tablet, Take 1,000 Units by mouth daily, Disp: , Rfl: 3     Warfarin Sodium (JANTOVEN PO), Take 1 mg by mouth daily 11/28/16: 1 mg Mon/Wed/Fri; 0.5 mg daily rest of week, Disp: , Rfl:      meclizine (ANTIVERT) 25 MG tablet, Take 1 tablet (25 mg) by mouth 3 times daily as needed for dizziness (For vertigo) (Patient not taking: Reported on 12/27/2018), Disp: 30 tablet, Rfl: 0     Allergies:   The patient reports no allergies.      Past Medical History:  Atrial fibrillation, on coumadin  Chronic cholecystitis   Congestive heart failure  Depression and anxiety   History of mitral/tricuspid valve replacement, on coumadin   PTSD  Rheumatic heart disease    Past Surgical History:  Cardiac pacemaker placement  Mitral valve replacement   Tricuspid valve replacement     Family History:   No family history of kidney disease.     Social History:   Here today with her son. She is non-English speaking. [place].     Physical Exam:  /71   Pulse 81   Resp 16   Ht 1.657 m (5' 5.25\")   Wt 80.3 kg (177 lb)   SpO2 99%   BMI 29.23 kg/m     GENERAL APPEARANCE: alert and no distress  EYES: nonicteric  HENT: mouth without ulcers or lesions  NECK: supple, no adenopathy  RESP: lungs clear to auscultation   CV: irregular rhythm, normal rate, no rub  ABDOMEN: soft, nontender, nondistended   Extremities: no edema  MS: no evidence of inflammation in joints, no muscle tenderness  SKIN: no concerning rash  NEURO: mentation intact and speech normal  PSYCH: affect normal/bright     Laboratory:  CMP  Recent Labs   Lab Test 12/27/18  1222 12/19/18  1134 11/27/18  1635 " 11/12/18  1107  11/28/16  1104 11/11/14  1055    138 137 137   < > 141 136   POTASSIUM 3.5 3.8 4.0 4.2   < > 3.6 3.8   CHLORIDE 105 105 104 106   < > 108 105   CO2 25 25 27 24   < > 23 26   ANIONGAP 6 8 6 7   < > 10 5   * 86 84 78   < > 88 90   BUN 13 21 23 23   < > 7 9   CR 1.12* 1.41* 1.43* 1.86*   < > 0.70 0.71   GFRESTIMATED 56* 42* 38* 28*   < > 89 88   GFRESTBLACK 65 49* 46* 34*   < > >90   GFR Calc   >90   GFR Calc     ARCADIO 8.4* 9.2 8.9 8.8   < > 8.9 8.7   PHOS 3.0 4.0 3.9 3.9   < >  --   --    PROTTOTAL  --   --   --   --   --  8.4 8.8   ALBUMIN 3.4 3.4 3.6 3.4   < > 3.4 3.5   BILITOTAL  --   --   --   --   --  0.5 0.4   ALKPHOS  --   --   --   --   --  111 151*   AST  --   --   --   --   --  27 21   ALT  --   --   --   --   --  18 17    < > = values in this interval not displayed.     CBC  Recent Labs   Lab Test 12/19/18  1134 11/08/18  1443 12/02/16  0758 11/30/16  1959 11/30/16  0617   HGB 8.9* 8.4* 11.4* 10.8* 10.4*   WBC  --  6.1 5.8 5.5 4.5   RBC  --  2.85* 3.85 3.61* 3.60*   HCT  --  26.3* 34.1* 32.5* 32.1*   MCV  --  92 89 90 89   MCH  --  29.5 29.6 29.9 28.9   MCHC  --  31.9 33.4 33.2 32.4   RDW  --  13.0 14.4 14.3 14.3   PLT  --  153 162 160 141*     INR  Recent Labs   Lab Test 12/02/16  0758 12/01/16  0643 11/30/16  0617 11/29/16  1830   INR 2.06* 2.52* 3.00* 2.65*     ABG  No lab results found.   URINE STUDIES  Recent Labs   Lab Test 11/08/18  1500 09/01/15  1225 11/11/14  1205   COLOR Yellow Yellow Light Yellow   APPEARANCE Clear Clear Clear   URINEGLC Negative Negative Negative   URINEBILI Negative Negative Negative   URINEKETONE Negative Negative Negative   SG 1.006 1.009 1.006   UBLD Large* Negative Negative   URINEPH 6.0 5.0 7.0   PROTEIN 100* Negative Negative   NITRITE Negative Negative Negative   LEUKEST Trace* Small* Negative   RBCU 150* <1 1   WBCU 8* <1 1     Recent Labs   Lab Test 12/19/18  1136 11/08/18  1500   UTPG 1.24* 2.00*     PTH  No  lab results found.  IRON STUDIES   No lab results found.    Scribe Disclosure:   I, Ruth Kitty, am serving as a scribe to document services personally performed by Jn Winkler MD at this visit, based upon the provider's statements to me. All documentation has been reviewed by the aforementioned provider prior to being entered into the official medical record.     Portions of this medical record were completed by a scribe. UPON MY REVIEW AND AUTHENTICATION BY ELECTRONIC SIGNATURE, this confirms (a) I performed the applicable clinical services, and (b) the record is accurate.    Total time spent was >40 minutes, and more than 50% of face to face time was spent in counseling and/or coordination of care regarding principles of multidisciplinary care, medication management, and chronic kidney disease education.  Jn Winkler MD

## 2018-12-27 NOTE — PATIENT INSTRUCTIONS
Preventive Care:    Breast Cancer Screening: During our visit today, we discussed that it is recommended you receive breast cancer screening. Please call or make an appointment with your primary care provider to discuss this with them. You may also call the  [facility] scheduling line ([phone]) to set up a mammography appointment at the [facility] within the [facility].    Colorectal Cancer Screening: During our visit today, we discussed that it is recommended you receive colorectal cancer screening. Please call or make an appointment with your primary care provider to discuss this. You may also call the  [facility] scheduling line ([phone]) to set up a colonoscopy appointment.

## 2018-12-27 NOTE — LETTER
2018       RE: Josey Franco  [address]  [address]  [facility] [facility] [address]     Dear Colleague,    Thank you for referring your patient, Josey Franco, to the [facility] NEPHROLOGY at [facility]. Please see a copy of my visit note below.      Nephrology Clinic    Jn Winkler MD  2018     Name: Josey Franco  MRN: [id number]  Age: 53 year old  : 1965  Referring provider: Edgardo Steele     Assessment and Plan:     1. Acute/Subacute Kidney Injury: Baseline Cr ~0.8 mg/dL with recent rise over the past couple months to 2.4 mg/dL though is much lower today at 1.12 mg/dL.  She has microscopic hematuria and albuminuria, though this has improved from previous (1260 down to 720 mg/g today).  Certainly, her kidney function may have declined from decreased renal perfusion/ischemia from volume depletion and lower blood pressures (106/71 today).  However, ongoing microscopic hematuria, albuminuria and elevated creatinine raises concern for an active glomerular process.  At this point, underlying ANCA negative or IgA - related proliferative glomerulonephritis remains high on the differential.  - Patient deferred renal biopsy and further treatments previously. Due to improved kidney function, will not start her on medications at this time. She is still planning trip to [facility] in 2018. Will plan to follow-up after she returns from this trip.   - During last visit, I discussed the potential for irreparable damage to the kidneys and the possibility of requiring dialysis should continued renal damage occur. Was also given dialysis education at that time.   - She voiced understanding of all risks involved with deferring on biopsy and further treatments for the time being  - Fortunately, her renal function is improving suggesting that much of her rise in serum creatinine may have been due to a hemodynamic insult but underlying glomerular disorder  seems likely though may not be as active as I initially thought  - RTC in 4 months.     2. BP/Volume: BP today  is 106/71, which appears to be typical of home blood pressures. Asymptomatic and euvolemic on exam. Though she was told to increase Metoprolol XL to 200 mg, she prefers to take 100 mg.   - No changes made today but would favor SBP closer to 120 to allow for renal perfusion  - Otherwise, continue metoprolol XL and digoxin as prescribed by cardiology for now, though will consider decreasing if SBP consistently ~100, especially if kidney function continues to decline.    - F/u with cardiology     3. Anemia: HGB of 8.9 on  12/19/18 with a baseline of around 10-11. This has improved slightly since last visit and I expect this to continue to improve. Asymptomatic. No historical features to suggest acute blood loss. She is on coumadin. No daily asprin. May be related to decreased EPO production from renal insufficiency.  - Paraproteinemic work-up unremarkable  - Would like to get iron studies  - Will add Vitamin B12 and RBC folate.  - Discussed dietary iron supplementation. Will not add other supplements at this time.   - Will consider EMMA at her upcoming clinic visit if hgb remains low     4. Rheumatic heart disease s/p bioprosthetic MV and TV, HFpEF, atrial fibrillation, heart block s/p RV pacemaker: No new issues. On Coumadin therapy. Last INR 2.7. Recently seen in cardiology clinic.  She continues on digoxin and metoprolol XL.       5. Hypothyroidism:  On levothyroxine (25 mcg daily).  She did not tolerate recent increase in dose.    - F/u with PCP    Follow-up: Return in about 4 months (around 4/27/2019).      was used to obtain history.     HPI:   Josey Franco is a 53 year old female with a history of with a history of rheumatic heart disease s/p bioprosthetic MV and TV, HFpEF, atrial fibrillation (on coumadin), heart block s/p RV pacemaker, cholelithiasis and hypothyroidism  who presents for  follow-up of subacute kidney injury. I last evaluated the patient on 11/12/2018. Renal ultrasound was negative at that time and patient did not want to move forward with other treatment/renal biopsy, as she wished to discuss findings with her family and was planning to travel home to [facility].    She previously was having fevers and feeling ill, however since our last evaluation, she has been feeling much better. She was previously taking significant amounts of tylenol, but had not been taking ibuprofen or Advil. Currently she is not using any tylenol. Her blood pressure is typically measured every two weeks by a nurse who comes to her home, and it is usually 100/60-70. She also states she was prescribed 200 mg metoprolol by her cardiologist, however she was concerned that this was a large dose, so she only takes 100 mg daily. She does not typically eat much meat. She does note some burning sensation in her chest intermittently as well as some knee pain when she sits down. She reports her palpitations have improved since starting metoprolol, though she does have palpitations prior to taking her medications. She has been taking these daily. She denies any recent difficulty breathing, rash, fevers, cuts, bloody stools, or blood in urine. She does not have menstrual periods.     Review of Systems:   Pertinent items are noted in HPI or as below, remainder of complete ROS is negative.      Active Medications:      Acetaminophen (TYLENOL PO), Take 325 mg by mouth every 6 hours as needed for mild pain or fever, Disp: , Rfl:      acetaminophen-codeine (TYLENOL W/CODEINE NO. 3) 300-30 MG per tablet, Take 1 tablet by mouth every 6 hours as needed for mild pain, Disp: 10 tablet, Rfl: 0     albuterol (PROAIR HFA, PROVENTIL HFA, VENTOLIN HFA) 108 (90 BASE) MCG/ACT inhaler, Inhale 2 puffs into the lungs, Disp: , Rfl:      ALPRAZolam (XANAX PO), Take 0.5-1 mg by mouth daily as needed for anxiety, Disp: , Rfl:      diclofenac  "(VOLTAREN) 1 % GEL, , Disp: , Rfl:      diltiazem HCl COATED BEADS 240 MG TB24, Take 240 mg by mouth, Disp: , Rfl:      levothyroxine (SYNTHROID) 25 mcg/mL, Take 25 mcg by mouth daily, Disp: , Rfl:      metoprolol succinate (TOPROL-XL) 200 MG 24 hr tablet, Take 100 mg by mouth daily, Disp: , Rfl: 3     Multiple Vitamin (DAILY ISMAEL PO), Take by mouth daily, Disp: , Rfl:      ondansetron (ZOFRAN-ODT) 4 MG disintegrating tablet, Take by mouth every 8 hours as needed for nausea, Disp: , Rfl:      pantoprazole (PROTONIX) 40 MG EC tablet, Take 1 tablet (40 mg) by mouth daily, Disp: 30 tablet, Rfl: 0     VITAMIN D3 1000 units tablet, Take 1,000 Units by mouth daily, Disp: , Rfl: 3     Warfarin Sodium (JANTOVEN PO), Take 1 mg by mouth daily 11/28/16: 1 mg Mon/Wed/Fri; 0.5 mg daily rest of week, Disp: , Rfl:      meclizine (ANTIVERT) 25 MG tablet, Take 1 tablet (25 mg) by mouth 3 times daily as needed for dizziness (For vertigo) (Patient not taking: Reported on 12/27/2018), Disp: 30 tablet, Rfl: 0     Allergies:   The patient reports no allergies.      Past Medical History:  Atrial fibrillation, on coumadin  Chronic cholecystitis   Congestive heart failure  Depression and anxiety   History of mitral/tricuspid valve replacement, on coumadin   PTSD  Rheumatic heart disease    Past Surgical History:  Cardiac pacemaker placement  Mitral valve replacement   Tricuspid valve replacement     Family History:   No family history of kidney disease.     Social History:   Here today with her son. She is non-English speaking. [place].     Physical Exam:  /71   Pulse 81   Resp 16   Ht 1.657 m (5' 5.25\")   Wt 80.3 kg (177 lb)   SpO2 99%   BMI 29.23 kg/m      GENERAL APPEARANCE: alert and no distress  EYES: nonicteric  HENT: mouth without ulcers or lesions  NECK: supple, no adenopathy  RESP: lungs clear to auscultation   CV: irregular rhythm, normal rate, no rub  ABDOMEN: soft, nontender, " nondistended   Extremities: no edema  MS: no evidence of inflammation in joints, no muscle tenderness  SKIN: no concerning rash  NEURO: mentation intact and speech normal  PSYCH: affect normal/bright     Laboratory:  CMP  Recent Labs   Lab Test 12/27/18  1222 12/19/18  1134 11/27/18  1635 11/12/18  1107  11/28/16  1104 11/11/14  1055    138 137 137   < > 141 136   POTASSIUM 3.5 3.8 4.0 4.2   < > 3.6 3.8   CHLORIDE 105 105 104 106   < > 108 105   CO2 25 25 27 24   < > 23 26   ANIONGAP 6 8 6 7   < > 10 5   * 86 84 78   < > 88 90   BUN 13 21 23 23   < > 7 9   CR 1.12* 1.41* 1.43* 1.86*   < > 0.70 0.71   GFRESTIMATED 56* 42* 38* 28*   < > 89 88   GFRESTBLACK 65 49* 46* 34*   < > >90   GFR Calc   >90   GFR Calc     ARCADIO 8.4* 9.2 8.9 8.8   < > 8.9 8.7   PHOS 3.0 4.0 3.9 3.9   < >  --   --    PROTTOTAL  --   --   --   --   --  8.4 8.8   ALBUMIN 3.4 3.4 3.6 3.4   < > 3.4 3.5   BILITOTAL  --   --   --   --   --  0.5 0.4   ALKPHOS  --   --   --   --   --  111 151*   AST  --   --   --   --   --  27 21   ALT  --   --   --   --   --  18 17    < > = values in this interval not displayed.     CBC  Recent Labs   Lab Test 12/19/18  1134 11/08/18  1443 12/02/16  0758 11/30/16  1959 11/30/16  0617   HGB 8.9* 8.4* 11.4* 10.8* 10.4*   WBC  --  6.1 5.8 5.5 4.5   RBC  --  2.85* 3.85 3.61* 3.60*   HCT  --  26.3* 34.1* 32.5* 32.1*   MCV  --  92 89 90 89   MCH  --  29.5 29.6 29.9 28.9   MCHC  --  31.9 33.4 33.2 32.4   RDW  --  13.0 14.4 14.3 14.3   PLT  --  153 162 160 141*     INR  Recent Labs   Lab Test 12/02/16  0758 12/01/16  0643 11/30/16  0617 11/29/16  1830   INR 2.06* 2.52* 3.00* 2.65*     ABG  No lab results found.   URINE STUDIES  Recent Labs   Lab Test 11/08/18  1500 09/01/15  1225 11/11/14  1205   COLOR Yellow Yellow Light Yellow   APPEARANCE Clear Clear Clear   URINEGLC Negative Negative Negative   URINEBILI Negative Negative Negative   URINEKETONE Negative Negative Negative   SG 1.006  1.009 1.006   UBLD Large* Negative Negative   URINEPH 6.0 5.0 7.0   PROTEIN 100* Negative Negative   NITRITE Negative Negative Negative   LEUKEST Trace* Small* Negative   RBCU 150* <1 1   WBCU 8* <1 1     Recent Labs   Lab Test 12/19/18  1136 11/08/18  1500   UTPG 1.24* 2.00*     PTH  No lab results found.  IRON STUDIES   No lab results found.    Scribe Disclosure:   I, Ruth Tang, am serving as a scribe to document services personally performed by Jn Winkler MD at this visit, based upon the provider's statements to me. All documentation has been reviewed by the aforementioned provider prior to being entered into the official medical record.     Portions of this medical record were completed by a scribe. UPON MY REVIEW AND AUTHENTICATION BY ELECTRONIC SIGNATURE, this confirms (a) I performed the applicable clinical services, and (b) the record is accurate.    Total time spent was >40 minutes, and more than 50% of face to face time was spent in counseling and/or coordination of care regarding principles of multidisciplinary care, medication management, and chronic kidney disease education.  Jn Winkler MD

## 2018-12-27 NOTE — NURSING NOTE
"Chief Complaint   Patient presents with     RECHECK     Proteinuria       Vital signs:      BP: 106/71 Pulse: 81   Resp: 16 SpO2: 99 %     Height: 165.7 cm (5' 5.25\") Weight: 80.3 kg (177 lb)  Estimated body mass index is 29.23 kg/m  as calculated from the following:    Height as of this encounter: 1.657 m (5' 5.25\").    Weight as of this encounter: 80.3 kg (177 lb).          Darcy Arora [facility]  12/27/2018 1:00 PM      "

## 2018-12-27 NOTE — LETTER
2018      RE: Josey Franco  [address]  [address]  [facility] [address]         Nephrology Clinic    Jn Winkler MD  2018     Name: Josey Franco  MRN: [id number]  Age: 53 year old  : 1965  Referring provider: Edgardo Steele     Assessment and Plan:     1. Acute/Subacute Kidney Injury: Baseline Cr ~0.8 mg/dL with recent rise over the past couple months to 2.4 mg/dL though is much lower today at 1.12 mg/dL.  She has microscopic hematuria and albuminuria, though this has improved from previous (1260 down to 720 mg/g today).  Certainly, her kidney function may have declined from decreased renal perfusion/ischemia from volume depletion and lower blood pressures (106/71 today).  However, ongoing microscopic hematuria, albuminuria and elevated creatinine raises concern for an active glomerular process.  At this point, underlying ANCA negative or IgA - related proliferative glomerulonephritis remains high on the differential.  - Patient deferred renal biopsy and further treatments previously. Due to improved kidney function, will not start her on medications at this time. She is still planning trip to hospitals in 2018. Will plan to follow-up after she returns from this trip.   - During last visit, I discussed the potential for irreparable damage to the kidneys and the possibility of requiring dialysis should continued renal damage occur. Was also given dialysis education at that time.   - She voiced understanding of all risks involved with deferring on biopsy and further treatments for the time being  - Fortunately, her renal function is improving suggesting that much of her rise in serum creatinine may have been due to a hemodynamic insult but underlying glomerular disorder seems likely though may not be as active as I initially thought  - RTC in 4 months.     2. BP/Volume: BP today  is 106/71, which appears to be typical of home blood pressures. Asymptomatic and  euvolemic on exam. Though she was told to increase Metoprolol XL to 200 mg, she prefers to take 100 mg.   - No changes made today but would favor SBP closer to 120 to allow for renal perfusion  - Otherwise, continue metoprolol XL and digoxin as prescribed by cardiology for now, though will consider decreasing if SBP consistently ~100, especially if kidney function continues to decline.    - F/u with cardiology     3. Anemia: HGB of 8.9 on  12/19/18 with a baseline of around 10-11. This has improved slightly since last visit and I expect this to continue to improve. Asymptomatic. No historical features to suggest acute blood loss. She is on coumadin. No daily asprin. May be related to decreased EPO production from renal insufficiency.  - Paraproteinemic work-up unremarkable  - Would like to get iron studies  - Will add Vitamin B12 and RBC folate.  - Discussed dietary iron supplementation. Will not add other supplements at this time.   - Will consider EMMA at her upcoming clinic visit if hgb remains low     4. Rheumatic heart disease s/p bioprosthetic MV and TV, HFpEF, atrial fibrillation, heart block s/p RV pacemaker: No new issues. On Coumadin therapy. Last INR 2.7. Recently seen in cardiology clinic.  She continues on digoxin and metoprolol XL.       5. Hypothyroidism:  On levothyroxine (25 mcg daily).  She did not tolerate recent increase in dose.    - F/u with PCP    Follow-up: Return in about 4 months (around 4/27/2019).      was used to obtain history.     HPI:   Josey Franco is a 53 year old female with a history of with a history of rheumatic heart disease s/p bioprosthetic MV and TV, HFpEF, atrial fibrillation (on coumadin), heart block s/p RV pacemaker, cholelithiasis and hypothyroidism  who presents for follow-up of subacute kidney injury. I last evaluated the patient on 11/12/2018. Renal ultrasound was negative at that time and patient did not want to move forward with other treatment/renal  biopsy, as she wished to discuss findings with her family and was planning to travel home to [facility].    She previously was having fevers and feeling ill, however since our last evaluation, she has been feeling much better. She was previously taking significant amounts of tylenol, but had not been taking ibuprofen or Advil. Currently she is not using any tylenol. Her blood pressure is typically measured every two weeks by a nurse who comes to her home, and it is usually 100/60-70. She also states she was prescribed 200 mg metoprolol by her cardiologist, however she was concerned that this was a large dose, so she only takes 100 mg daily. She does not typically eat much meat. She does note some burning sensation in her chest intermittently as well as some knee pain when she sits down. She reports her palpitations have improved since starting metoprolol, though she does have palpitations prior to taking her medications. She has been taking these daily. She denies any recent difficulty breathing, rash, fevers, cuts, bloody stools, or blood in urine. She does not have menstrual periods.     Review of Systems:   Pertinent items are noted in HPI or as below, remainder of complete ROS is negative.      Active Medications:      Acetaminophen (TYLENOL PO), Take 325 mg by mouth every 6 hours as needed for mild pain or fever, Disp: , Rfl:      acetaminophen-codeine (TYLENOL W/CODEINE NO. 3) 300-30 MG per tablet, Take 1 tablet by mouth every 6 hours as needed for mild pain, Disp: 10 tablet, Rfl: 0     albuterol (PROAIR HFA, PROVENTIL HFA, VENTOLIN HFA) 108 (90 BASE) MCG/ACT inhaler, Inhale 2 puffs into the lungs, Disp: , Rfl:      ALPRAZolam (XANAX PO), Take 0.5-1 mg by mouth daily as needed for anxiety, Disp: , Rfl:      diclofenac (VOLTAREN) 1 % GEL, , Disp: , Rfl:      diltiazem HCl COATED BEADS 240 MG TB24, Take 240 mg by mouth, Disp: , Rfl:      levothyroxine (SYNTHROID) 25 mcg/mL, Take 25 mcg by mouth daily, Disp: , Rfl:  "     metoprolol succinate (TOPROL-XL) 200 MG 24 hr tablet, Take 100 mg by mouth daily, Disp: , Rfl: 3     Multiple Vitamin (DAILY ISMAEL PO), Take by mouth daily, Disp: , Rfl:      ondansetron (ZOFRAN-ODT) 4 MG disintegrating tablet, Take by mouth every 8 hours as needed for nausea, Disp: , Rfl:      pantoprazole (PROTONIX) 40 MG EC tablet, Take 1 tablet (40 mg) by mouth daily, Disp: 30 tablet, Rfl: 0     VITAMIN D3 1000 units tablet, Take 1,000 Units by mouth daily, Disp: , Rfl: 3     Warfarin Sodium (JANTOVEN PO), Take 1 mg by mouth daily 11/28/16: 1 mg Mon/Wed/Fri; 0.5 mg daily rest of week, Disp: , Rfl:      meclizine (ANTIVERT) 25 MG tablet, Take 1 tablet (25 mg) by mouth 3 times daily as needed for dizziness (For vertigo) (Patient not taking: Reported on 12/27/2018), Disp: 30 tablet, Rfl: 0     Allergies:   The patient reports no allergies.      Past Medical History:  Atrial fibrillation, on coumadin  Chronic cholecystitis   Congestive heart failure  Depression and anxiety   History of mitral/tricuspid valve replacement, on coumadin   PTSD  Rheumatic heart disease    Past Surgical History:  Cardiac pacemaker placement  Mitral valve replacement   Tricuspid valve replacement     Family History:   No family history of kidney disease.     Social History:   Here today with her son. She is non-English speaking. [place] has been in the [place].     Physical Exam:  /71   Pulse 81   Resp 16   Ht 1.657 m (5' 5.25\")   Wt 80.3 kg (177 lb)   SpO2 99%   BMI 29.23 kg/m      GENERAL APPEARANCE: alert and no distress  EYES: nonicteric  HENT: mouth without ulcers or lesions  NECK: supple, no adenopathy  RESP: lungs clear to auscultation   CV: irregular rhythm, normal rate, no rub  ABDOMEN: soft, nontender, nondistended   Extremities: no edema  MS: no evidence of inflammation in joints, no muscle tenderness  SKIN: no concerning rash  NEURO: mentation intact and speech normal  PSYCH: affect normal/bright "     Laboratory:  CMP  Recent Labs   Lab Test 12/27/18  1222 12/19/18  1134 11/27/18  1635 11/12/18  1107  11/28/16  1104 11/11/14  1055    138 137 137   < > 141 136   POTASSIUM 3.5 3.8 4.0 4.2   < > 3.6 3.8   CHLORIDE 105 105 104 106   < > 108 105   CO2 25 25 27 24   < > 23 26   ANIONGAP 6 8 6 7   < > 10 5   * 86 84 78   < > 88 90   BUN 13 21 23 23   < > 7 9   CR 1.12* 1.41* 1.43* 1.86*   < > 0.70 0.71   GFRESTIMATED 56* 42* 38* 28*   < > 89 88   GFRESTBLACK 65 49* 46* 34*   < > >90   GFR Calc   >90   GFR Calc     ARCADIO 8.4* 9.2 8.9 8.8   < > 8.9 8.7   PHOS 3.0 4.0 3.9 3.9   < >  --   --    PROTTOTAL  --   --   --   --   --  8.4 8.8   ALBUMIN 3.4 3.4 3.6 3.4   < > 3.4 3.5   BILITOTAL  --   --   --   --   --  0.5 0.4   ALKPHOS  --   --   --   --   --  111 151*   AST  --   --   --   --   --  27 21   ALT  --   --   --   --   --  18 17    < > = values in this interval not displayed.     CBC  Recent Labs   Lab Test 12/19/18  1134 11/08/18  1443 12/02/16  0758 11/30/16  1959 11/30/16  0617   HGB 8.9* 8.4* 11.4* 10.8* 10.4*   WBC  --  6.1 5.8 5.5 4.5   RBC  --  2.85* 3.85 3.61* 3.60*   HCT  --  26.3* 34.1* 32.5* 32.1*   MCV  --  92 89 90 89   MCH  --  29.5 29.6 29.9 28.9   MCHC  --  31.9 33.4 33.2 32.4   RDW  --  13.0 14.4 14.3 14.3   PLT  --  153 162 160 141*     INR  Recent Labs   Lab Test 12/02/16  0758 12/01/16  0643 11/30/16  0617 11/29/16  1830   INR 2.06* 2.52* 3.00* 2.65*     ABG  No lab results found.   URINE STUDIES  Recent Labs   Lab Test 11/08/18  1500 09/01/15  1225 11/11/14  1205   COLOR Yellow Yellow Light Yellow   APPEARANCE Clear Clear Clear   URINEGLC Negative Negative Negative   URINEBILI Negative Negative Negative   URINEKETONE Negative Negative Negative   SG 1.006 1.009 1.006   UBLD Large* Negative Negative   URINEPH 6.0 5.0 7.0   PROTEIN 100* Negative Negative   NITRITE Negative Negative Negative   LEUKEST Trace* Small* Negative   RBCU 150* <1 1   WBCU 8* <1  1     Recent Labs   Lab Test 12/19/18  1136 11/08/18  1500   UTPG 1.24* 2.00*     PTH  No lab results found.  IRON STUDIES   No lab results found.    Scribe Disclosure:   I, Ruth Tang, am serving as a scribe to document services personally performed by Jn Winkler MD at this visit, based upon the provider's statements to me. All documentation has been reviewed by the aforementioned provider prior to being entered into the official medical record.     Portions of this medical record were completed by a scribe. UPON MY REVIEW AND AUTHENTICATION BY ELECTRONIC SIGNATURE, this confirms (a) I performed the applicable clinical services, and (b) the record is accurate.    Total time spent was >40 minutes, and more than 50% of face to face time was spent in counseling and/or coordination of care regarding principles of multidisciplinary care, medication management, and chronic kidney disease education.  Jn Winkler MD

## 2019-04-07 DIAGNOSIS — R31.29 MICROSCOPIC HEMATURIA: ICD-10-CM

## 2019-04-07 DIAGNOSIS — R79.89 ELEVATED SERUM CREATININE: Primary | ICD-10-CM

## 2020-02-26 ENCOUNTER — MEDICAL CORRESPONDENCE (OUTPATIENT)
Dept: HEALTH INFORMATION MANAGEMENT | Facility: CLINIC | Age: 55
End: 2020-02-26

## 2020-02-27 ENCOUNTER — TRANSCRIBE ORDERS (OUTPATIENT)
Dept: OTHER | Age: 55
End: 2020-02-27

## 2020-02-27 DIAGNOSIS — N05.9 NEPHRITIS: Primary | ICD-10-CM

## 2020-03-25 ENCOUNTER — VIRTUAL VISIT (OUTPATIENT)
Dept: NEPHROLOGY | Facility: CLINIC | Age: 55
End: 2020-03-25
Attending: INTERNAL MEDICINE
Payer: COMMERCIAL

## 2020-03-25 DIAGNOSIS — R80.9 PROTEINURIA, UNSPECIFIED TYPE: ICD-10-CM

## 2020-03-25 DIAGNOSIS — R31.29 MICROSCOPIC HEMATURIA: Primary | ICD-10-CM

## 2020-03-25 NOTE — PROGRESS NOTES
"Josey Franco is a 55 year old female who is being evaluated via a billable telephone visit.      The patient has been notified of following:     \"This telephone visit will be conducted via a call between you and your physician/provider. We have found that certain health care needs can be provided without the need for a physical exam.  This service lets us provide the care you need with a short phone conversation.  If a prescription is necessary we can send it directly to your pharmacy.  If lab work is needed we can place an order for that and you can then stop by our lab to have the test done at a later time.    If during the course of the call the physician/provider feels a telephone visit is not appropriate, you will not be charged for this service.\"     Josey Franco complains of dark urine. She has a history of probable IgA Nephropathy - we have been seeing her for proteinuria and hematuria.     assistance from her son Jamey.     Mrs. Franco states she has been having some pain in her kidneys as well as some darker urine, as well as potentially some odor in her urine. She states the pain is somewhat constant when seated, but no pain with ambulation. This has been ongoing for ~1 week. It has not been changing. She notes some control with Tylenol. She is using Tylenol currently for her back pain, taking it daily, 3 times daily.      She is not having fever. She states that her breathing and LE swelling is about the same as her baseline.      She does not recall having any recent acute illness.     I have reviewed and updated the patient's Past Medical History, Social History, Family History and Medication List.    ALLERGIES  Patient has no known allergies.    Additional provider notes:   Data reviewed. UA from 2/28 with known proteinuria/hematuria. BMP from 1/2020 w creatinine 0.9.    Assessment/Plan:  #CKD  #Hematuria, Proteinuria, probable IgA Nephropathy  Her history and UAs and previous negative " serology fit with IgA Nephropathy. Her most recent labs suggest renal recovery from previous SILVIA, with crt at 0.9 mg/dL.     However, her current symptoms warrant lab testing at this time to ensure stability in renal function, but also to rule out Urinary Tract Infection. I have ordered renal panel, UPCR, and UA with Culture.     Given her description of her back pain, seems more MSK than urinary tract related.     We explained that she needs to get these checked at a [facility], so we can see the results.     Will follow up with the patient once her results are in.    Phone call duration: 30 minutes    MARCELO Senior MD - Neph Fellow    Phone call was conducted with Staff - Dr. Winkler. He was on the phone call for its entirety.    Total time spent was 40 minutes of which 30 minutes was spent with the patient on the telephone.  More than 50% of the time was spent in counseling and/or coordination of care regarding principles of multidisciplinary care, medication management, and chronic kidney disease education.  Jn Winkler MD

## 2020-06-30 DIAGNOSIS — R80.9 PROTEINURIA, UNSPECIFIED TYPE: ICD-10-CM

## 2020-06-30 DIAGNOSIS — R31.29 MICROSCOPIC HEMATURIA: Primary | ICD-10-CM

## 2020-07-06 ENCOUNTER — VIRTUAL VISIT (OUTPATIENT)
Dept: NEPHROLOGY | Facility: CLINIC | Age: 55
End: 2020-07-06
Attending: INTERNAL MEDICINE
Payer: COMMERCIAL

## 2020-07-06 DIAGNOSIS — R80.8 OTHER PROTEINURIA: ICD-10-CM

## 2020-07-06 DIAGNOSIS — N17.9 AKI (ACUTE KIDNEY INJURY) (H): Primary | ICD-10-CM

## 2020-07-06 DIAGNOSIS — R31.29 OTHER MICROSCOPIC HEMATURIA: ICD-10-CM

## 2020-07-06 NOTE — LETTER
2020       RE: Josey Franco  [address]  [address]  [facility] [address]     Dear Colleague,    Thank you for referring your patient, Josey Franco, to the [facility] NEPHROLOGY at [facility]. Please see a copy of my visit note below.      Nephrology Clinic - Telephone Visit    Jn Winkler MD  2020     Name: Josey Franco  MRN: [id number]  Age: 55 year old  : 1965  Referring provider: Edgardo Steele     Assessment and Plan:     1. Acute/Subacute Kidney Injury/CKD: Baseline Cr ~0.8 mg/dL with rise in 2018 to 2.4 mg/dL though is much lower recently 1.1 mg/dL (May 2020).  She has microscopic hematuria and albuminuria, though this has improved from previous (1260 down to 720 mg/g at last check though was over a year ago).  Certainly, her kidney function may have declined from decreased renal perfusion/ischemia from volume depletion and lower blood pressures in 2018.  However, ongoing microscopic hematuria, albuminuria and elevated creatinine raises concern for an active glomerular process.  An extensive serologic w/u was performed and unremarkable.  Underlying, IgA remains high on the differential and seems most likely.  - Patient deferred renal biopsy and further treatments previously. Due to improved kidney function, will not start her on medications at this time.    - She voiced understanding of all risks involved with deferring on kidney biopsy in the past  - Fortunately, her renal function has improved suggesting that much of her rise in serum creatinine may have been due to a hemodynamic insult but underlying glomerular disorder such as IgA seems likely though may not be as active as I initially thought  - RTC in 6 months.     2. BP/Volume: BP has been at goal and low at times. Asymptomatic and euvolemic on previous exam. Though she was told to increase Metoprolol XL to 200 mg, she prefers to take 100 mg.   - No changes made today but  would favor SBP closer to 120 to allow for renal perfusion  - Otherwise, continue metoprolol XL and diltiazem as prescribed by cardiology for now, though will consider decreasing if SBP consistently ~100, especially if kidney function declines.    - F/u with cardiology     3. Anemia: HGB of 11 in May, 2020. Asymptomatic. No historical features to suggest acute blood loss. She is on coumadin. No daily asprin. May be related to decreased EPO production from renal insufficiency but seems mostly related to iron defficiency.  - Paraproteinemic work-up unremarkable  - Would like to get iron studies at next visit  - Will add Vitamin B12 and RBC folate.  - Discussed dietary iron supplementation. Will not add other supplements at this time.   - Will consider EMMA at her upcoming clinic visit if hgb <10     4. Rheumatic heart disease s/p bioprosthetic MV and TV, HFpEF, atrial fibrillation, heart block s/p RV pacemaker: No new issues. On Coumadin therapy. Followed closely by cardiology and her PCP.  She continues on diltiazem and metoprolol XL.       5. Hypothyroidism:  On levothyroxine.    - F/u with PCP    Follow-up: Return in about 6 months (around 1/6/2021).      was used to obtain history.     HPI:   Josey Franco is a 53 year old woman with a history of with a history of rheumatic heart disease s/p bioprosthetic MV and TV, HFpEF, atrial fibrillation (on coumadin), heart block s/p RV pacemaker, cholelithiasis and hypothyroidism who presents for follow-up of subacute kidney injury. I first evaluated the patient in 2018. Renal ultrasound was negative at that time and patient did not want to move forward with other treatment/renal biopsy, as she wished to discuss findings with her family and was planning to travel home to [facility].    She previously was having fevers and feeling ill, however since our last evaluation, she has been feeling much better. She was previously taking significant amounts of tylenol, but  had not been taking ibuprofen or Advil. Currently she is not using any tylenol. Her blood pressure is typically measured every two weeks by a nurse who comes to her home, and it is usually 100/60-70. She also states she was prescribed 200 mg metoprolol by her cardiologist, however she was concerned that this was a large dose, so she only takes 100 mg daily. She does not typically eat much meat. She does note some burning sensation in her chest intermittently as well as some knee pain when she sits down. She reports her palpitations have improved since starting metoprolol, though she does have palpitations prior to taking her medications. She also appears to be on diltiazem.  She has been taking these daily. She denies any recent difficulty breathing, rash, fevers, cuts, bloody stools, or blood in urine. She does not have menstrual periods.  She complains of intermitten edema when sitting for prolonged periods of time.       Review of Systems:   Pertinent items are noted in HPI or as below, remainder of complete ROS is negative.      Active Medications:   Current Outpatient Medications   Medication     Acetaminophen (TYLENOL PO)     diclofenac (VOLTAREN) 1 % GEL     diltiazem HCl COATED BEADS 240 MG TB24     levothyroxine (SYNTHROID) 25 mcg/mL     metoprolol succinate (TOPROL-XL) 200 MG 24 hr tablet     Multiple Vitamin (DAILY ISMAEL PO)     pantoprazole (PROTONIX) 40 MG EC tablet     acetaminophen-codeine (TYLENOL W/CODEINE NO. 3) 300-30 MG per tablet     albuterol (PROAIR HFA, PROVENTIL HFA, VENTOLIN HFA) 108 (90 BASE) MCG/ACT inhaler     ALPRAZolam (XANAX PO)     meclizine (ANTIVERT) 25 MG tablet     ondansetron (ZOFRAN-ODT) 4 MG disintegrating tablet     VITAMIN D3 1000 units tablet     Warfarin Sodium (JANTOVEN PO)     No current facility-administered medications for this visit.          Allergies:   The patient reports no allergies.      Past Medical History:  Atrial fibrillation, on coumadin  Chronic  cholecystitis   Congestive heart failure  Depression and anxiety   History of mitral/tricuspid valve replacement, on coumadin   PTSD  Rheumatic heart disease    Past Surgical History:  Cardiac pacemaker placement  Mitral valve replacement   Tricuspid valve replacement     Family History:   No family history of kidney disease.     Social History:   Here today with her son. She is non-English speaking. [place].     Physical Exam:  Deferred as encounter was a telephone visit.      Laboratory:  CMP  Recent Labs   Lab Test 12/27/18  1222 12/19/18  1134 11/27/18  1635 11/12/18  1107  11/28/16  1104 11/11/14  1055    138 137 137   < > 141 136   POTASSIUM 3.5 3.8 4.0 4.2   < > 3.6 3.8   CHLORIDE 105 105 104 106   < > 108 105   CO2 25 25 27 24   < > 23 26   ANIONGAP 6 8 6 7   < > 10 5   * 86 84 78   < > 88 90   BUN 13 21 23 23   < > 7 9   CR 1.12* 1.41* 1.43* 1.86*   < > 0.70 0.71   GFRESTIMATED 56* 42* 38* 28*   < > 89 88   GFRESTBLACK 65 49* 46* 34*   < > >90   GFR Calc   >90   GFR Calc     ARCADIO 8.4* 9.2 8.9 8.8   < > 8.9 8.7   PHOS 3.0 4.0 3.9 3.9   < >  --   --    PROTTOTAL  --   --   --   --   --  8.4 8.8   ALBUMIN 3.4 3.4 3.6 3.4   < > 3.4 3.5   BILITOTAL  --   --   --   --   --  0.5 0.4   ALKPHOS  --   --   --   --   --  111 151*   AST  --   --   --   --   --  27 21   ALT  --   --   --   --   --  18 17    < > = values in this interval not displayed.     CBC  Recent Labs   Lab Test 12/19/18  1134 11/08/18  1443 12/02/16  0758 11/30/16  1959 11/30/16  0617   HGB 8.9* 8.4* 11.4* 10.8* 10.4*   WBC  --  6.1 5.8 5.5 4.5   RBC  --  2.85* 3.85 3.61* 3.60*   HCT  --  26.3* 34.1* 32.5* 32.1*   MCV  --  92 89 90 89   MCH  --  29.5 29.6 29.9 28.9   MCHC  --  31.9 33.4 33.2 32.4   RDW  --  13.0 14.4 14.3 14.3   PLT  --  153 162 160 141*     INR  Recent Labs   Lab Test 12/02/16  0758 12/01/16  0643 11/30/16  0617 11/29/16  1830   INR 2.06* 2.52* 3.00* 2.65*      ABG  No lab results found.   URINE STUDIES  Recent Labs   Lab Test 11/08/18  1500 09/01/15  1225 11/11/14  1205   COLOR Yellow Yellow Light Yellow   APPEARANCE Clear Clear Clear   URINEGLC Negative Negative Negative   URINEBILI Negative Negative Negative   URINEKETONE Negative Negative Negative   SG 1.006 1.009 1.006   UBLD Large* Negative Negative   URINEPH 6.0 5.0 7.0   PROTEIN 100* Negative Negative   NITRITE Negative Negative Negative   LEUKEST Trace* Small* Negative   RBCU 150* <1 1   WBCU 8* <1 1     Recent Labs   Lab Test 12/19/18  1136 11/08/18  1500   UTPG 1.24* 2.00*     PTH  No lab results found.  IRON STUDIES   Recent Labs   Lab Test 12/27/18  1222   IRON 53      IRONSAT 18   SANDRA 84      Josey Franco is a 55 year old female who is being evaluated via a billable telephone visit.      Phone call duration:  30 minutes    Total time spent was 30 minutes, and more than 50% of face to face time was spent in counseling and/or coordination of care regarding principles of multidisciplinary care, medication management, and chronic kidney disease education.  Jn Winkler MD

## 2020-07-06 NOTE — PROGRESS NOTES
Nephrology Clinic - Telephone Visit    Jn Winkler MD  2020     Name: Josey Franco  MRN: [id number]  Age: 55 year old  : 1965  Referring provider: Edgardo Steele     Assessment and Plan:     1. Acute/Subacute Kidney Injury/CKD: Baseline Cr ~0.8 mg/dL with rise in 2018 to 2.4 mg/dL though is much lower recently 1.1 mg/dL (May 2020).  She has microscopic hematuria and albuminuria, though this has improved from previous (1260 down to 720 mg/g at last check though was over a year ago).  Certainly, her kidney function may have declined from decreased renal perfusion/ischemia from volume depletion and lower blood pressures in 2018.  However, ongoing microscopic hematuria, albuminuria and elevated creatinine raises concern for an active glomerular process.  An extensive serologic w/u was performed and unremarkable.  Underlying, IgA remains high on the differential and seems most likely.  - Patient deferred renal biopsy and further treatments previously. Due to improved kidney function, will not start her on medications at this time.    - She voiced understanding of all risks involved with deferring on kidney biopsy in the past  - Fortunately, her renal function has improved suggesting that much of her rise in serum creatinine may have been due to a hemodynamic insult but underlying glomerular disorder such as IgA seems likely though may not be as active as I initially thought  - RTC in 6 months.     2. BP/Volume: BP has been at goal and low at times. Asymptomatic and euvolemic on previous exam. Though she was told to increase Metoprolol XL to 200 mg, she prefers to take 100 mg.   - No changes made today but would favor SBP closer to 120 to allow for renal perfusion  - Otherwise, continue metoprolol XL and diltiazem as prescribed by cardiology for now, though will consider decreasing if SBP consistently ~100, especially if kidney function declines.    - F/u with cardiology     3. Anemia: HGB  of 11 in May, 2020. Asymptomatic. No historical features to suggest acute blood loss. She is on coumadin. No daily asprin. May be related to decreased EPO production from renal insufficiency but seems mostly related to iron defficiency.  - Paraproteinemic work-up unremarkable  - Would like to get iron studies at next visit  - Will add Vitamin B12 and RBC folate.  - Discussed dietary iron supplementation. Will not add other supplements at this time.   - Will consider EMMA at her upcoming clinic visit if hgb <10     4. Rheumatic heart disease s/p bioprosthetic MV and TV, HFpEF, atrial fibrillation, heart block s/p RV pacemaker: No new issues. On Coumadin therapy. Followed closely by cardiology and her PCP.  She continues on diltiazem and metoprolol XL.       5. Hypothyroidism:  On levothyroxine.    - F/u with PCP    Follow-up: Return in about 6 months (around 1/6/2021).      was used to obtain history.     HPI:   Josey Franco is a 53 year old woman with a history of with a history of rheumatic heart disease s/p bioprosthetic MV and TV, HFpEF, atrial fibrillation (on coumadin), heart block s/p RV pacemaker, cholelithiasis and hypothyroidism who presents for follow-up of subacute kidney injury. I first evaluated the patient in 2018. Renal ultrasound was negative at that time and patient did not want to move forward with other treatment/renal biopsy, as she wished to discuss findings with her family and was planning to travel home to [facility].    She previously was having fevers and feeling ill, however since our last evaluation, she has been feeling much better. She was previously taking significant amounts of tylenol, but had not been taking ibuprofen or Advil. Currently she is not using any tylenol. Her blood pressure is typically measured every two weeks by a nurse who comes to her home, and it is usually 100/60-70. She also states she was prescribed 200 mg metoprolol by her cardiologist, however she was  concerned that this was a large dose, so she only takes 100 mg daily. She does not typically eat much meat. She does note some burning sensation in her chest intermittently as well as some knee pain when she sits down. She reports her palpitations have improved since starting metoprolol, though she does have palpitations prior to taking her medications. She also appears to be on diltiazem.  She has been taking these daily. She denies any recent difficulty breathing, rash, fevers, cuts, bloody stools, or blood in urine. She does not have menstrual periods.  She complains of intermitten edema when sitting for prolonged periods of time.       Review of Systems:   Pertinent items are noted in HPI or as below, remainder of complete ROS is negative.      Active Medications:   Current Outpatient Medications   Medication     Acetaminophen (TYLENOL PO)     diclofenac (VOLTAREN) 1 % GEL     diltiazem HCl COATED BEADS 240 MG TB24     levothyroxine (SYNTHROID) 25 mcg/mL     metoprolol succinate (TOPROL-XL) 200 MG 24 hr tablet     Multiple Vitamin (DAILY ISMAEL PO)     pantoprazole (PROTONIX) 40 MG EC tablet     acetaminophen-codeine (TYLENOL W/CODEINE NO. 3) 300-30 MG per tablet     albuterol (PROAIR HFA, PROVENTIL HFA, VENTOLIN HFA) 108 (90 BASE) MCG/ACT inhaler     ALPRAZolam (XANAX PO)     meclizine (ANTIVERT) 25 MG tablet     ondansetron (ZOFRAN-ODT) 4 MG disintegrating tablet     VITAMIN D3 1000 units tablet     Warfarin Sodium (JANTOVEN PO)     No current facility-administered medications for this visit.          Allergies:   The patient reports no allergies.      Past Medical History:  Atrial fibrillation, on coumadin  Chronic cholecystitis   Congestive heart failure  Depression and anxiety   History of mitral/tricuspid valve replacement, on coumadin   PTSD  Rheumatic heart disease    Past Surgical History:  Cardiac pacemaker placement  Mitral valve replacement   Tricuspid valve replacement     Family History:   No family  history of kidney disease.     Social History:   Here today with her son. She is non-English speaking. She has been in the [place].     Physical Exam:  Deferred as encounter was a telephone visit.      Laboratory:  CMP  Recent Labs   Lab Test 12/27/18  1222 12/19/18  1134 11/27/18  1635 11/12/18  1107  11/28/16  1104 11/11/14  1055    138 137 137   < > 141 136   POTASSIUM 3.5 3.8 4.0 4.2   < > 3.6 3.8   CHLORIDE 105 105 104 106   < > 108 105   CO2 25 25 27 24   < > 23 26   ANIONGAP 6 8 6 7   < > 10 5   * 86 84 78   < > 88 90   BUN 13 21 23 23   < > 7 9   CR 1.12* 1.41* 1.43* 1.86*   < > 0.70 0.71   GFRESTIMATED 56* 42* 38* 28*   < > 89 88   GFRESTBLACK 65 49* 46* 34*   < > >90   GFR Calc   >90   GFR Calc     ARCADIO 8.4* 9.2 8.9 8.8   < > 8.9 8.7   PHOS 3.0 4.0 3.9 3.9   < >  --   --    PROTTOTAL  --   --   --   --   --  8.4 8.8   ALBUMIN 3.4 3.4 3.6 3.4   < > 3.4 3.5   BILITOTAL  --   --   --   --   --  0.5 0.4   ALKPHOS  --   --   --   --   --  111 151*   AST  --   --   --   --   --  27 21   ALT  --   --   --   --   --  18 17    < > = values in this interval not displayed.     CBC  Recent Labs   Lab Test 12/19/18  1134 11/08/18  1443 12/02/16  0758 11/30/16  1959 11/30/16  0617   HGB 8.9* 8.4* 11.4* 10.8* 10.4*   WBC  --  6.1 5.8 5.5 4.5   RBC  --  2.85* 3.85 3.61* 3.60*   HCT  --  26.3* 34.1* 32.5* 32.1*   MCV  --  92 89 90 89   MCH  --  29.5 29.6 29.9 28.9   MCHC  --  31.9 33.4 33.2 32.4   RDW  --  13.0 14.4 14.3 14.3   PLT  --  153 162 160 141*     INR  Recent Labs   Lab Test 12/02/16  0758 12/01/16  0643 11/30/16  0617 11/29/16  1830   INR 2.06* 2.52* 3.00* 2.65*     ABG  No lab results found.   URINE STUDIES  Recent Labs   Lab Test 11/08/18  1500 09/01/15  1225 11/11/14  1205   COLOR Yellow Yellow Light Yellow   APPEARANCE Clear Clear Clear   URINEGLC Negative Negative Negative   URINEBILI Negative Negative Negative   URINEKETONE Negative Negative Negative  "  SG 1.006 1.009 1.006   UBLD Large* Negative Negative   URINEPH 6.0 5.0 7.0   PROTEIN 100* Negative Negative   NITRITE Negative Negative Negative   LEUKEST Trace* Small* Negative   RBCU 150* <1 1   WBCU 8* <1 1     Recent Labs   Lab Test 12/19/18  1136 11/08/18  1500   UTPG 1.24* 2.00*     PTH  No lab results found.  IRON STUDIES   Recent Labs   Lab Test 12/27/18  1222   IRON 53      IRONSAT 18   SANDRA 84      Josey Franco is a 55 year old female who is being evaluated via a billable telephone visit.      The patient has been notified of following:     \"This telephone visit will be conducted via a call between you and your physician/provider. We have found that certain health care needs can be provided without the need for a physical exam.  This service lets us provide the care you need with a short phone conversation.  If a prescription is necessary we can send it directly to your pharmacy.  If lab work is needed we can place an order for that and you can then stop by our lab to have the test done at a later time.    Telephone visits are billed at different rates depending on your insurance coverage. During this emergency period, for some insurers they may be billed the same as an in-person visit.  Please reach out to your insurance provider with any questions.    If during the course of the call the physician/provider feels a telephone visit is not appropriate, you will not be charged for this service.\"    Patient has given verbal consent for Telephone visit?  Yes    What phone number would you like to be contacted at? Please call  services first at [phone]    How would you like to obtain your AVS? Mail a copy    Phone call duration:  30 minutes    Total time spent was 30 minutes, and more than 50% of face to face time was spent in counseling and/or coordination of care regarding principles of multidisciplinary care, medication management, and chronic kidney disease education.  Jn" Santi Winkler MD

## 2021-03-01 ENCOUNTER — ANCILLARY PROCEDURE (OUTPATIENT)
Dept: CT IMAGING | Facility: CLINIC | Age: 56
End: 2021-03-01
Attending: INTERNAL MEDICINE
Payer: COMMERCIAL

## 2021-03-01 ENCOUNTER — APPOINTMENT (OUTPATIENT)
Dept: INTERPRETER SERVICES | Facility: CLINIC | Age: 56
End: 2021-03-01
Payer: COMMERCIAL

## 2021-03-01 DIAGNOSIS — R31.21 ASYMPTOMATIC MICROSCOPIC HEMATURIA: ICD-10-CM

## 2021-03-01 LAB
CREAT BLD-MCNC: 1.4 MG/DL (ref 0.52–1.04)
GFR SERPL CREATININE-BSD FRML MDRD: 39 ML/MIN/{1.73_M2}

## 2021-03-01 PROCEDURE — 36415 COLL VENOUS BLD VENIPUNCTURE: CPT | Performed by: PATHOLOGY

## 2021-03-01 PROCEDURE — 82565 ASSAY OF CREATININE: CPT | Performed by: PATHOLOGY

## 2021-03-01 PROCEDURE — 74178 CT ABD&PLV WO CNTR FLWD CNTR: CPT | Performed by: RADIOLOGY

## 2021-03-01 RX ORDER — IOPAMIDOL 755 MG/ML
108 INJECTION, SOLUTION INTRAVASCULAR ONCE
Status: COMPLETED | OUTPATIENT
Start: 2021-03-01 | End: 2021-03-01

## 2021-03-01 RX ADMIN — IOPAMIDOL 108 ML: 755 INJECTION, SOLUTION INTRAVASCULAR at 16:19

## 2021-03-04 ENCOUNTER — MEDICAL CORRESPONDENCE (OUTPATIENT)
Dept: HEALTH INFORMATION MANAGEMENT | Facility: CLINIC | Age: 56
End: 2021-03-04

## 2021-03-04 ENCOUNTER — TRANSFERRED RECORDS (OUTPATIENT)
Dept: HEALTH INFORMATION MANAGEMENT | Facility: CLINIC | Age: 56
End: 2021-03-04

## 2021-03-09 NOTE — TELEPHONE ENCOUNTER
MEDICAL RECORDS REQUEST   [facility]  Urology Clinic  [address] [place] [address]  PHONE: [phone]  Fax: [phone]        FUTURE VISIT INFORMATION                                                   CARON Mensah: 1965 scheduled for future visit at [facility]    APPOINTMENT INFORMATION:    Date: 3/12/2021    Provider:  Guy DURAN    Reason for Visit/Diagnosis: Micro Hematuria     REFERRAL INFORMATION:    Referring provider:  N/A    Specialty: N/A    Referring providers clinic:      Clinic contact number:  N/A    RECORDS REQUESTED FOR VISIT                                                     NOTES  STATUS/DETAILS   OFFICE NOTE from referring provider  yes   OFFICE NOTE from other specialist  no   DISCHARGE SUMMARY from hospital  no   DISCHARGE REPORT from the ER  no   OPERATIVE REPORT  no   MEDICATION LIST  no   LABS     URINALYSIS (UA)  yes     PRE-VISIT CHECKLIST      Record collection complete Yes   Appointment appropriately scheduled           (right time/right provider) Yes   MyChart activation If no, please explain: In process   Questionnaire complete If no, please explain: In process      Completed by: Darcy Bernardo

## 2021-03-12 ENCOUNTER — VIRTUAL VISIT (OUTPATIENT)
Dept: UROLOGY | Facility: CLINIC | Age: 56
End: 2021-03-12
Payer: COMMERCIAL

## 2021-03-12 ENCOUNTER — PRE VISIT (OUTPATIENT)
Dept: UROLOGY | Facility: CLINIC | Age: 56
End: 2021-03-12

## 2021-03-12 DIAGNOSIS — R31.29 MICROSCOPIC HEMATURIA: Primary | ICD-10-CM

## 2021-03-12 PROCEDURE — 99203 OFFICE O/P NEW LOW 30 MIN: CPT | Mod: 95 | Performed by: NURSE PRACTITIONER

## 2021-03-12 ASSESSMENT — PAIN SCALES - GENERAL: PAINLEVEL: NO PAIN (0)

## 2021-03-12 NOTE — PROGRESS NOTES
Video Visit Technology for this patient: Multiple attempts made to reach the patient via [employer] and [employer], but unable to connect. The decision was therefore made to pursue a telephone visit with the aid of a Cymro .     Josey is a 56 year old who is being evaluated via a billable video visit.      How would you like to obtain your AVS? Mail a copy    Duration of telephone call: 25 minutes      Josey Franco complains of   Chief Complaint   Patient presents with     Consult     Microscopic hematuria          Assessment/Plan:  56 year old female with a history of Afib on warfarin, found to have microscopic hematuria.  The differential diagnosis at this point includes, infection, vaginal contaminant, urothelial malignancy, renal disorder versus another yet unknown diagnosis.    At this time, recommend proceeding with comprehensive hematuria evaluation to include:  - Urinalysis and urine culture to rule out an acute urinary tract infection.   - Urine cytology to look for cells concerning for malignancy.  - CT urogram--complete 3/1/21.   - Cystoscopy with the first available urologist to evaluate the interior of the bladder. Follow up for hematuria as recommended by urologist performing cystoscopic evaluation.      Chanda Tovar, CNP  Department of Urology      Subjective:   56 year old female with a history of atrial fibrillation on warfarin who presents for evaluation of microscopic hematuria. UA obtained 2/4/20 showed >60 RBC/HPF; culture showed growth of mixed rosalind, suggesting probable contamination.     She had a CT urogram on 3/1/21 that showed some minimal scarring in the right kidney posteriorly, along with a tiny benign left renal cyst, but her kidneys, ureters, and bladder were otherwise negative.     History is obtained from the patient with the aid of a professional Cymro  and supplemented by chart review. She currently denies any gross hematuria or other urinary symptoms,  including dysuria, pyuria, hesitancy, intermittency, feelings of incomplete emptying, or any recent history of urinary tract infections or stones.

## 2021-03-12 NOTE — PATIENT INSTRUCTIONS
-We will send your urine for repeat analysis, culture, and cytology (to look for any unusual cells coming from the urinary tract).  -We will also have you return for a cystoscopy with the next available urologist to evaluate the interior of your bladder.     Chanda Tovar, CNP  Department of Urology

## 2021-03-12 NOTE — NURSING NOTE
Chief Complaint   Patient presents with     Consult     Microscopic hematuria        not currently breastfeeding. There is no height or weight on file to calculate BMI.    Patient Active Problem List   Diagnosis     Chest pain     Atrial fibrillation (H)     Chronic cholecystitis     Heart failure (H)     Atrioventricular block     Social maladjustment     Major depressive disorder, recurrent episode, moderate (H)     Mitral stenosis     Posttraumatic stress disorder     Functional disturbance following cardiac surgery     Heart disease, rheumatic     Heart valve replaced     Cardiac pacemaker - Single chamber [employer]onic     Keloid scar     Hemoptysis     S/P mitral valve replacement with bioprosthetic valve     History of tricuspid valve replacement with bioprosthetic valve       No Known Allergies    Current Outpatient Medications   Medication Sig Dispense Refill     Acetaminophen (TYLENOL PO) Take 325 mg by mouth every 6 hours as needed for mild pain or fever       acetaminophen-codeine (TYLENOL W/CODEINE NO. 3) 300-30 MG per tablet Take 1 tablet by mouth every 6 hours as needed for mild pain (Patient not taking: Reported on 7/6/2020) 10 tablet 0     albuterol (PROAIR HFA, PROVENTIL HFA, VENTOLIN HFA) 108 (90 BASE) MCG/ACT inhaler Inhale 2 puffs into the lungs       ALPRAZolam (XANAX PO) Take 0.5-1 mg by mouth daily as needed for anxiety       diclofenac (VOLTAREN) 1 % GEL        diltiazem HCl COATED BEADS 240 MG TB24 Take 240 mg by mouth       levothyroxine (SYNTHROID) 25 mcg/mL Take 25 mcg by mouth daily       meclizine (ANTIVERT) 25 MG tablet Take 1 tablet (25 mg) by mouth 3 times daily as needed for dizziness (For vertigo) (Patient not taking: Reported on 12/27/2018) 30 tablet 0     metoprolol succinate (TOPROL-XL) 200 MG 24 hr tablet Take 100 mg by mouth daily  3     Multiple Vitamin (DAILY ISMAEL PO) Take by mouth daily       ondansetron (ZOFRAN-ODT) 4 MG disintegrating tablet Take by mouth every 8 hours as  needed for nausea       pantoprazole (PROTONIX) 40 MG EC tablet Take 1 tablet (40 mg) by mouth daily 30 tablet 0     VITAMIN D3 1000 units tablet Take 1,000 Units by mouth daily  3     Warfarin Sodium (JANTOVEN PO) Take 1 mg by mouth daily 11/28/16: 1 mg Mon/Wed/Fri; 0.5 mg daily rest of week         Social History     Tobacco Use     Smoking status: Never Smoker     Smokeless tobacco: Never Used   Substance Use Topics     Alcohol use: No     Drug use: No       Sherry Barkley CMA  3/12/2021  4:22 PM

## 2021-03-12 NOTE — LETTER
/12/2021       RE: Josey Franco  [address]  [address]  [facility] [address]     Dear Colleague,    Thank you for referring your patient, Josey Franco, to the [facility] [place] at [facility]. Please see a copy of my visit note below.    Video Visit Technology for this patient: Multiple attempts made to reach the patient via [employer] and [employer] but unable to connect. The decision was therefore made to pursue a telephone visit with the aid of a Senegalese .     Josey is a 56 year old who is being evaluated via a billable video visit.      How would you like to obtain your AVS? Mail a copy    Duration of telephone call: 25 minutes      Josey Franco complains of   Chief Complaint   Patient presents with     Consult     Microscopic hematuria          Assessment/Plan:  56 year old female with a history of Afib on warfarin, found to have microscopic hematuria.  The differential diagnosis at this point includes, infection, vaginal contaminant, urothelial malignancy, renal disorder versus another yet unknown diagnosis.    At this time, recommend proceeding with comprehensive hematuria evaluation to include:  - Urinalysis and urine culture to rule out an acute urinary tract infection.   - Urine cytology to look for cells concerning for malignancy.  - CT urogram--complete 3/1/21.   - Cystoscopy with the first available urologist to evaluate the interior of the bladder. Follow up for hematuria as recommended by urologist performing cystoscopic evaluation.      Chanda Tovar, CNP  Department of Urology      Subjective:   56 year old female with a history of atrial fibrillation on warfarin who presents for evaluation of microscopic hematuria. UA obtained 2/4/20 showed >60 RBC/HPF; culture showed growth of mixed rosalind, suggesting probable contamination.     She had a CT urogram on 3/1/21 that showed some minimal scarring in the right  kidney posteriorly, along with a tiny benign left renal cyst, but her kidneys, ureters, and bladder were otherwise negative.     History is obtained from the patient with the aid of a professional Cook Islander  and supplemented by chart review. She currently denies any gross hematuria or other urinary symptoms, including dysuria, pyuria, hesitancy, intermittency, feelings of incomplete emptying, or any recent history of urinary tract infections or stones.    Again, thank you for allowing me to participate in the care of your patient.      Sincerely,    Chanda Tovar, CNP

## 2021-03-19 ENCOUNTER — APPOINTMENT (OUTPATIENT)
Dept: INTERPRETER SERVICES | Facility: CLINIC | Age: 56
End: 2021-03-19
Payer: COMMERCIAL

## 2021-04-28 DIAGNOSIS — R31.29 MICROSCOPIC HEMATURIA: Primary | ICD-10-CM

## 2021-04-28 DIAGNOSIS — N17.9 AKI (ACUTE KIDNEY INJURY) (H): ICD-10-CM

## 2021-05-03 ENCOUNTER — VIRTUAL VISIT (OUTPATIENT)
Dept: NEPHROLOGY | Facility: CLINIC | Age: 56
End: 2021-05-03
Attending: INTERNAL MEDICINE
Payer: COMMERCIAL

## 2021-05-03 DIAGNOSIS — N18.31 STAGE 3A CHRONIC KIDNEY DISEASE (H): ICD-10-CM

## 2021-05-03 DIAGNOSIS — R31.29 MICROSCOPIC HEMATURIA: ICD-10-CM

## 2021-05-03 DIAGNOSIS — R80.1 PERSISTENT PROTEINURIA: Primary | ICD-10-CM

## 2021-05-03 PROCEDURE — 99214 OFFICE O/P EST MOD 30 MIN: CPT | Mod: 95 | Performed by: INTERNAL MEDICINE

## 2021-05-03 RX ORDER — LOSARTAN POTASSIUM 25 MG/1
25 TABLET ORAL DAILY
Qty: 30 TABLET | Refills: 11 | Status: SHIPPED | OUTPATIENT
Start: 2021-05-03

## 2021-05-03 RX ORDER — FERROUS SULFATE 325(65) MG
325 TABLET ORAL
COMMUNITY

## 2021-05-03 NOTE — PROGRESS NOTES
Nephrology Clinic - Telephone Visit    Jn Winkler MD  2021     Name: Josey Franco  MRN: [id number]  Age: 56 year old  : 1965  Referring provider: Edgardo Steele     Assessment and Plan:     1. Acute/Subacute Kidney Injury/CKD 3a: Baseline Cr ~0.8 mg/dL with rise in 2018 to 2.4 mg/dL though is much lower recently 1.2-1.4 mg/dL with an eGFR~50 ml/min () which is likely her baseline.  She has microscopic hematuria and albuminuria, though this has improved from previous (1260 down to 780 mg/g at last check in 2021).  Certainly, her kidney function may have declined from decreased renal perfusion/ischemia from volume depletion and lower blood pressures in 2018.  However, ongoing microscopic hematuria, albuminuria and elevated creatinine raises concern for glomerular process.  An extensive serologic w/u was performed and unremarkable.  Underlying IgA nephropathy remains high on the differential and seems most likely.  - Patient deferred renal biopsy and further treatments previously.   - She voiced understanding of all risks involved with deferring on kidney biopsy in the past  - Fortunately, her renal function has improved suggesting that much of her rise in serum creatinine may have been due to a hemodynamic insult but underlying glomerular disorder such as IgA seems likely though may not be as active as I initially thought  -will start losartan at 25 mg daily with the goal of minimizing albuminuria and slowing progression of CKD.  -repeat renal panel in 2 weeks  -patient expected to undergo cystoscopy for microscopic hematuria per Urology  - RTC in 3 months.     2. BP/Volume: BP has been at above goal per her son. Asymptomatic and euvolemic on previous exam. Though she was told to increase Metoprolol XL to 200 mg in  psat, she prefers to take 100 mg.   -she will continue metoprolol XL (100 mg) and diltiazem ER (240 mg) as prescribed by cardiology   -will start losartan at 25  mg daily for both HTN and albuminuria; renal panel in 2 weeks  -continue with home BP monitoring and report to PCP or renal clinic     3. Anemia: HGB 9.4 in March, 2021. Asymptomatic. No historical features to suggest acute blood loss. She is on coumadin. No daily asprin. May be related to decreased EPO production from renal insufficiency but seems mostly related to iron defficiency (iron sat 18 and ferritin 25 in March, 2021).  Serum  B-12 was normal and serum folate on the lower side.    - Paraproteinemic work-up unremarkable  - Would like to get iron studies at next visit  - Will add Vitamin B12 and RBC folate.  - she no appears to be on an iron supplement and a multivitamin   - NO changes made today.  Will continue to monitor closely.       4. Rheumatic heart disease s/p bioprosthetic MV and TV, HFpEF, atrial fibrillation, heart block s/p RV pacemaker: No new issues. On Coumadin therapy. Followed closely by cardiology and her PCP.  She continues on diltiazem and metoprolol XL.       5. Hypothyroidism:  On levothyroxine.    - F/u with PCP    Follow-up: RTC in 3 months      was used to obtain history.     HPI:   Josey Franco is a 56 year old woman with a history of with a history of rheumatic heart disease s/p bioprosthetic MV and TV, HFpEF, atrial fibrillation (on coumadin), heart block s/p RV pacemaker, cholelithiasis and hypothyroidism who presents for follow-up of subacute kidney injury/CKD. I first evaluated the patient in 2018. Renal ultrasound was negative at that time and patient did not want to move forward with other treatment/renal biopsy, as she wished to discuss findings with her family and was planning to travel home to [facility].    At a previous visit, she was having fevers and feeling ill.. She was previously taking significant amounts of tylenol, but had not been taking NSAIDsl. Currently she is not using any tylenol and feels well. Her blood pressure was typically measured every two  weeks by a nurse who comes to her home, and it had been 100/60-70. She also states she was prescribed 200 mg metoprolol by her cardiologist, however she was concerned that this was a large dose, so she only takes 100 mg daily. She does not typically eat much meat. She does note some burning sensation in her chest intermittently as well as some knee pain when she sits down. She reports her palpitations have improved since starting metoprolol. She also appears to be on diltiazem.  She has been taking these daily. She denies any recent difficulty breathing, rash, fevers, cuts, bloody stools, or gross blood in urine. She does not have menstrual periods.  She complains of intermitten edema when sitting for prolonged periods of time.      She has known microscopic hematuria and proteinuria.  Her Cr was noted to be elevated in 2018 (2.1 mg/dL) during a time when she likely had a viral illness. Her Cr has  To baseline of ~1.2-14 mg/dL.  Her albuminuria has improved from 1.26 g/g (peak in 2018) to 0.78 g/g (2/4/2021).  She continues to have microscopic hematuria and may soon undergo a cystoscopy per Urology.  Her son reports that her home blood pressures are much higher as of late and is being followed closely by her PCP.      Review of Systems:   Pertinent items are noted in HPI or as below, remainder of complete ROS is negative.      Active Medications:   Current Outpatient Medications   Medication     Acetaminophen (TYLENOL PO)     albuterol (PROAIR HFA, PROVENTIL HFA, VENTOLIN HFA) 108 (90 BASE) MCG/ACT inhaler     ALPRAZolam (XANAX PO)     diclofenac (VOLTAREN) 1 % GEL     diltiazem HCl COATED BEADS 240 MG TB24     ferrous sulfate (FEROSUL) 325 (65 Fe) MG tablet     levothyroxine (SYNTHROID) 25 mcg/mL     metoprolol succinate (TOPROL-XL) 200 MG 24 hr tablet     Multiple Vitamin (DAILY ISMAEL PO)     ondansetron (ZOFRAN-ODT) 4 MG disintegrating tablet     pantoprazole (PROTONIX) 40 MG EC tablet     VITAMIN D3 1000 units  tablet     Warfarin Sodium (JANTOVEN PO)     acetaminophen-codeine (TYLENOL W/CODEINE NO. 3) 300-30 MG per tablet     meclizine (ANTIVERT) 25 MG tablet     No current facility-administered medications for this visit.          Allergies:   The patient reports no allergies.      Past Medical History:  Atrial fibrillation, on coumadin  Chronic cholecystitis   Congestive heart failure  Depression and anxiety   History of mitral/tricuspid valve replacement, on coumadin   PTSD  Rheumatic heart disease    Past Surgical History:  Cardiac pacemaker placement  Mitral valve replacement   Tricuspid valve replacement     Family History:   No family history of kidney disease.     Social History:   Here today with her son. She is non-English speaking. [place].     Physical Exam:  Deferred as encounter was a telephone visit.      Laboratory:  CMP  Recent Labs   Lab Test 03/01/21  1607 12/27/18  1222 12/19/18  1134 11/27/18  1635 11/12/18  1107 11/28/16  1104 11/28/16  1104 11/11/14  1055   NA  --  136 138 137 137   < > 141 136   POTASSIUM  --  3.5 3.8 4.0 4.2   < > 3.6 3.8   CHLORIDE  --  105 105 104 106   < > 108 105   CO2  --  25 25 27 24   < > 23 26   ANIONGAP  --  6 8 6 7   < > 10 5   GLC  --  112* 86 84 78   < > 88 90   BUN  --  13 21 23 23   < > 7 9   CR  --  1.12* 1.41* 1.43* 1.86*   < > 0.70 0.71   GFRESTIMATED 39* 56* 42* 38* 28*   < > 89 88   GFRESTBLACK 47* 65 49* 46* 34*   < > >90   GFR Calc   >90   GFR Calc     ARCADIO  --  8.4* 9.2 8.9 8.8   < > 8.9 8.7   PHOS  --  3.0 4.0 3.9 3.9   < >  --   --    PROTTOTAL  --   --   --   --   --   --  8.4 8.8   ALBUMIN  --  3.4 3.4 3.6 3.4   < > 3.4 3.5   BILITOTAL  --   --   --   --   --   --  0.5 0.4   ALKPHOS  --   --   --   --   --   --  111 151*   AST  --   --   --   --   --   --  27 21   ALT  --   --   --   --   --   --  18 17    < > = values in this interval not displayed.     CBC  Recent Labs   Lab Test  "12/19/18  1134 11/08/18  1443 12/02/16  0758 11/30/16  1959 11/30/16  0617   HGB 8.9* 8.4* 11.4* 10.8* 10.4*   WBC  --  6.1 5.8 5.5 4.5   RBC  --  2.85* 3.85 3.61* 3.60*   HCT  --  26.3* 34.1* 32.5* 32.1*   MCV  --  92 89 90 89   MCH  --  29.5 29.6 29.9 28.9   MCHC  --  31.9 33.4 33.2 32.4   RDW  --  13.0 14.4 14.3 14.3   PLT  --  153 162 160 141*     INR  Recent Labs   Lab Test 12/02/16  0758 12/01/16  0643 11/30/16  0617 11/29/16  1830   INR 2.06* 2.52* 3.00* 2.65*     ABG  No lab results found.   URINE STUDIES  Recent Labs   Lab Test 11/08/18  1500 09/01/15  1225 11/11/14  1205   COLOR Yellow Yellow Light Yellow   APPEARANCE Clear Clear Clear   URINEGLC Negative Negative Negative   URINEBILI Negative Negative Negative   URINEKETONE Negative Negative Negative   SG 1.006 1.009 1.006   UBLD Large* Negative Negative   URINEPH 6.0 5.0 7.0   PROTEIN 100* Negative Negative   NITRITE Negative Negative Negative   LEUKEST Trace* Small* Negative   RBCU 150* <1 1   WBCU 8* <1 1     Recent Labs   Lab Test 12/19/18  1136 11/08/18  1500   UTPG 1.24* 2.00*     PTH  No lab results found.  IRON STUDIES   Recent Labs   Lab Test 12/27/18  1222   IRON 53      IRONSAT 18   SANDRA 84      Josey Franco is a 56 year old female who is being evaluated via a billable telephone visit.      The patient has been notified of following:     \"This telephone visit will be conducted via a call between you and your physician/provider. We have found that certain health care needs can be provided without the need for a physical exam.  This service lets us provide the care you need with a short phone conversation.  If a prescription is necessary we can send it directly to your pharmacy.  If lab work is needed we can place an order for that and you can then stop by our lab to have the test done at a later time.    Telephone visits are billed at different rates depending on your insurance coverage. During this emergency period, for some insurers " "they may be billed the same as an in-person visit.  Please reach out to your insurance provider with any questions.    If during the course of the call the physician/provider feels a telephone visit is not appropriate, you will not be charged for this service.\"    Patient has given verbal consent for Telephone visit?  Yes    What phone number would you like to be contacted at? Please call  services first at [phone]    How would you like to obtain your AVS? Mail a copy    Phone call duration:  30 minutes    Total time spent was 30 minutes, and more than 50% of face to face time was spent in counseling and/or coordination of care regarding principles of multidisciplinary care, medication management, and chronic kidney disease education.  Jn Winkler MD    "

## 2021-05-03 NOTE — LETTER
5/3/2021       RE: Josey Franco  [address]  [address]  [place] [address]     Dear Colleague,    Thank you for referring your patient, Josey Franco, to the [facility] [place] at [facility]. Please see a copy of my visit note below.    used  service [phone].     Josey is a 56 year old who is being evaluated via a billable telephone visit.      What phone number would you like to be contacted at? [phone]  How would you like to obtain your AVS? Mail a copy  Phone call duration: 30 minutes    Ana CHAVEZ [facility]      Nephrology Clinic - Telephone Visit    Jn Winkler MD  2021     Name: Josey Franco  MRN: [id number]  Age: 56 year old  : 1965  Referring provider: Edgardo Steele     Assessment and Plan:     1. Acute/Subacute Kidney Injury/CKD 3a: Baseline Cr ~0.8 mg/dL with rise in 2018 to 2.4 mg/dL though is much lower recently 1.2-1.4 mg/dL with an eGFR~50 ml/min () which is likely her baseline.  She has microscopic hematuria and albuminuria, though this has improved from previous (1260 down to 780 mg/g at last check in 2021).  Certainly, her kidney function may have declined from decreased renal perfusion/ischemia from volume depletion and lower blood pressures in 2018.  However, ongoing microscopic hematuria, albuminuria and elevated creatinine raises concern for glomerular process.  An extensive serologic w/u was performed and unremarkable.  Underlying IgA nephropathy remains high on the differential and seems most likely.  - Patient deferred renal biopsy and further treatments previously.   - She voiced understanding of all risks involved with deferring on kidney biopsy in the past  - Fortunately, her renal function has improved suggesting that much of her rise in serum creatinine may have been due to a hemodynamic insult but underlying glomerular disorder such as IgA seems  likely though may not be as active as I initially thought  -will start losartan at 25 mg daily with the goal of minimizing albuminuria and slowing progression of CKD.  -repeat renal panel in 2 weeks  -patient expected to undergo cystoscopy for microscopic hematuria per Urology  - RTC in 3 months.     2. BP/Volume: BP has been at above goal per her son. Asymptomatic and euvolemic on previous exam. Though she was told to increase Metoprolol XL to 200 mg in th psat, she prefers to take 100 mg.   -she will continue metoprolol XL (100 mg) and diltiazem ER (240 mg) as prescribed by cardiology   -will start losartan at 25 mg daily for both HTN and albuminuria; renal panel in 2 weeks  -continue with home BP monitoring and report to PCP or renal clinic     3. Anemia: HGB 9.4 in March, 2021. Asymptomatic. No historical features to suggest acute blood loss. She is on coumadin. No daily asprin. May be related to decreased EPO production from renal insufficiency but seems mostly related to iron defficiency (iron sat 18 and ferritin 25 in March, 2021).  Serum  B-12 was normal and serum folate on the lower side.    - Paraproteinemic work-up unremarkable  - Would like to get iron studies at next visit  - Will add Vitamin B12 and RBC folate.  - she no appears to be on an iron supplement and a multivitamin   - NO changes made today.  Will continue to monitor closely.       4. Rheumatic heart disease s/p bioprosthetic MV and TV, HFpEF, atrial fibrillation, heart block s/p RV pacemaker: No new issues. On Coumadin therapy. Followed closely by cardiology and her PCP.  She continues on diltiazem and metoprolol XL.       5. Hypothyroidism:  On levothyroxine.    - F/u with PCP    Follow-up: RTC in 3 months      was used to obtain history.     HPI:   Josey Franco is a 56 year old woman with a history of with a history of rheumatic heart disease s/p bioprosthetic MV and TV, HFpEF, atrial fibrillation (on coumadin), heart block s/p  RV pacemaker, cholelithiasis and hypothyroidism who presents for follow-up of subacute kidney injury/CKD. I first evaluated the patient in 2018. Renal ultrasound was negative at that time and patient did not want to move forward with other treatment/renal biopsy, as she wished to discuss findings with her family and was planning to travel home to [facility].    At a previous visit, she was having fevers and feeling ill.. She was previously taking significant amounts of tylenol, but had not been taking NSAIDsl. Currently she is not using any tylenol and feels well. Her blood pressure was typically measured every two weeks by a nurse who comes to her home, and it had been 100/60-70. She also states she was prescribed 200 mg metoprolol by her cardiologist, however she was concerned that this was a large dose, so she only takes 100 mg daily. She does not typically eat much meat. She does note some burning sensation in her chest intermittently as well as some knee pain when she sits down. She reports her palpitations have improved since starting metoprolol. She also appears to be on diltiazem.  She has been taking these daily. She denies any recent difficulty breathing, rash, fevers, cuts, bloody stools, or gross blood in urine. She does not have menstrual periods.  She complains of intermitten edema when sitting for prolonged periods of time.      She has known microscopic hematuria and proteinuria.  Her Cr was noted to be elevated in 2018 (2.1 mg/dL) during a time when she likely had a viral illness. Her Cr has  To baseline of ~1.2-14 mg/dL.  Her albuminuria has improved from 1.26 g/g (peak in 2018) to 0.78 g/g (2/4/2021).  She continues to have microscopic hematuria and may soon undergo a cystoscopy per Urology.  Her son reports that her home blood pressures are much higher as of late and is being followed closely by her PCP.      Review of Systems:   Pertinent items are noted in HPI or as below, remainder of complete  ROS is negative.      Active Medications:   Current Outpatient Medications   Medication     Acetaminophen (TYLENOL PO)     albuterol (PROAIR HFA, PROVENTIL HFA, VENTOLIN HFA) 108 (90 BASE) MCG/ACT inhaler     ALPRAZolam (XANAX PO)     diclofenac (VOLTAREN) 1 % GEL     diltiazem HCl COATED BEADS 240 MG TB24     ferrous sulfate (FEROSUL) 325 (65 Fe) MG tablet     levothyroxine (SYNTHROID) 25 mcg/mL     metoprolol succinate (TOPROL-XL) 200 MG 24 hr tablet     Multiple Vitamin (DAILY ISMAEL PO)     ondansetron (ZOFRAN-ODT) 4 MG disintegrating tablet     pantoprazole (PROTONIX) 40 MG EC tablet     VITAMIN D3 1000 units tablet     Warfarin Sodium (JANTOVEN PO)     acetaminophen-codeine (TYLENOL W/CODEINE NO. 3) 300-30 MG per tablet     meclizine (ANTIVERT) 25 MG tablet     No current facility-administered medications for this visit.          Allergies:   The patient reports no allergies.      Past Medical History:  Atrial fibrillation, on coumadin  Chronic cholecystitis   Congestive heart failure  Depression and anxiety   History of mitral/tricuspid valve replacement, on coumadin   PTSD  Rheumatic heart disease    Past Surgical History:  Cardiac pacemaker placement  Mitral valve replacement   Tricuspid valve replacement     Family History:   No family history of kidney disease.     Social History:   Here today with her son. She is non-English speaking. [place].     Physical Exam:  Deferred as encounter was a telephone visit.      Laboratory:  CMP  Recent Labs   Lab Test 03/01/21  1607 12/27/18  1222 12/19/18  1134 11/27/18  1635 11/12/18  1107 11/28/16  1104 11/28/16  1104 11/11/14  1055   NA  --  136 138 137 137   < > 141 136   POTASSIUM  --  3.5 3.8 4.0 4.2   < > 3.6 3.8   CHLORIDE  --  105 105 104 106   < > 108 105   CO2  --  25 25 27 24   < > 23 26   ANIONGAP  --  6 8 6 7   < > 10 5   GLC  --  112* 86 84 78   < > 88 90   BUN  --  13 21 23 23   < > 7 9   CR  --  1.12* 1.41* 1.43* 1.86*   < >  "0.70 0.71   GFRESTIMATED 39* 56* 42* 38* 28*   < > 89 88   GFRESTBLACK 47* 65 49* 46* 34*   < > >90   GFR Calc   >90   GFR Calc     ARCADIO  --  8.4* 9.2 8.9 8.8   < > 8.9 8.7   PHOS  --  3.0 4.0 3.9 3.9   < >  --   --    PROTTOTAL  --   --   --   --   --   --  8.4 8.8   ALBUMIN  --  3.4 3.4 3.6 3.4   < > 3.4 3.5   BILITOTAL  --   --   --   --   --   --  0.5 0.4   ALKPHOS  --   --   --   --   --   --  111 151*   AST  --   --   --   --   --   --  27 21   ALT  --   --   --   --   --   --  18 17    < > = values in this interval not displayed.     CBC  Recent Labs   Lab Test 12/19/18  1134 11/08/18  1443 12/02/16  0758 11/30/16  1959 11/30/16  0617   HGB 8.9* 8.4* 11.4* 10.8* 10.4*   WBC  --  6.1 5.8 5.5 4.5   RBC  --  2.85* 3.85 3.61* 3.60*   HCT  --  26.3* 34.1* 32.5* 32.1*   MCV  --  92 89 90 89   MCH  --  29.5 29.6 29.9 28.9   MCHC  --  31.9 33.4 33.2 32.4   RDW  --  13.0 14.4 14.3 14.3   PLT  --  153 162 160 141*     INR  Recent Labs   Lab Test 12/02/16  0758 12/01/16  0643 11/30/16  0617 11/29/16  1830   INR 2.06* 2.52* 3.00* 2.65*     ABG  No lab results found.   URINE STUDIES  Recent Labs   Lab Test 11/08/18  1500 09/01/15  1225 11/11/14  1205   COLOR Yellow Yellow Light Yellow   APPEARANCE Clear Clear Clear   URINEGLC Negative Negative Negative   URINEBILI Negative Negative Negative   URINEKETONE Negative Negative Negative   SG 1.006 1.009 1.006   UBLD Large* Negative Negative   URINEPH 6.0 5.0 7.0   PROTEIN 100* Negative Negative   NITRITE Negative Negative Negative   LEUKEST Trace* Small* Negative   RBCU 150* <1 1   WBCU 8* <1 1     Recent Labs   Lab Test 12/19/18  1136 11/08/18  1500   UTPG 1.24* 2.00*     PTH  No lab results found.  IRON STUDIES   Recent Labs   Lab Test 12/27/18  1222   IRON 53      IRONSAT 18   SANDRA 84      Josey Franco is a 56 year old female who is being evaluated via a billable telephone visit.      The patient has been notified of following:     \"This " "telephone visit will be conducted via a call between you and your physician/provider. We have found that certain health care needs can be provided without the need for a physical exam.  This service lets us provide the care you need with a short phone conversation.  If a prescription is necessary we can send it directly to your pharmacy.  If lab work is needed we can place an order for that and you can then stop by our lab to have the test done at a later time.    Telephone visits are billed at different rates depending on your insurance coverage. During this emergency period, for some insurers they may be billed the same as an in-person visit.  Please reach out to your insurance provider with any questions.    If during the course of the call the physician/provider feels a telephone visit is not appropriate, you will not be charged for this service.\"    Patient has given verbal consent for Telephone visit?  Yes    What phone number would you like to be contacted at? Please call  services first at [phone]    How would you like to obtain your AVS? Mail a copy    Phone call duration:  30 minutes    Total time spent was 30 minutes, and more than 50% of face to face time was spent in counseling and/or coordination of care regarding principles of multidisciplinary care, medication management, and chronic kidney disease education.  Jn Winkler MD      "

## 2021-05-03 NOTE — PROGRESS NOTES
used  service [phone].     Josey is a 56 year old who is being evaluated via a billable telephone visit.      What phone number would you like to be contacted at? [phone]  How would you like to obtain your AVS? Mail a copy  Phone call duration: 30 minutes    Ana CHAVEZ CMA

## 2021-08-23 ENCOUNTER — VIRTUAL VISIT (OUTPATIENT)
Dept: NEPHROLOGY | Facility: CLINIC | Age: 56
End: 2021-08-23
Attending: INTERNAL MEDICINE
Payer: COMMERCIAL

## 2021-08-23 DIAGNOSIS — N18.31 CHRONIC KIDNEY DISEASE, STAGE 3A (H): Primary | ICD-10-CM

## 2021-08-23 DIAGNOSIS — R31.21 ASYMPTOMATIC MICROSCOPIC HEMATURIA: ICD-10-CM

## 2021-08-23 DIAGNOSIS — R80.1 PERSISTENT PROTEINURIA: ICD-10-CM

## 2021-08-23 PROCEDURE — 99443 PR PHYSICIAN TELEPHONE EVALUATION 21-30 MIN: CPT | Mod: 95 | Performed by: INTERNAL MEDICINE

## 2021-08-23 ASSESSMENT — PAIN SCALES - GENERAL: PAINLEVEL: NO PAIN (0)

## 2021-08-23 NOTE — LETTER
8/23/2021       RE: Josey Franco  [address]  [address]  [facility] [address]     Dear Colleague,    Thank you for referring your patient, Josey Franco, to the [facility] [place] at [facility]. Please see a copy of my visit note below.    Josey is a 56 year old who is being evaluated via a billable telephone visit.      What phone number would you like to be contacted at? [phone]  How would you like to obtain your AVS? Mail a copy  Phone call duration: 30 minutes    August 23, 2021    Assessment/Plan:  CKD  Proteinuria  Microscopic hematuria  Baseline Cr ~0.8 mg/dL with rise in 2018 to 2.4 mg/dL though is much lower recently 1.2-1.4 mg/dL with an eGFR~50 ml/min (2021) which is likely her baseline. Paraproteinemia workup has been negative.  Concerning for IgA nephropathy vs other GN given proteinuria and hematuria but Cr now improved to 1.02 per note from PCP Dr Steele from 6/15/2021, although unable to personally view the actual labs in CareEverywhere.   - Has deferred renal biopsy before  - Remains on ARB.   - Will continue close monitoring and follow-up with labs.    Anemia  Iron deficient and folate level also low. Vit B12 was normal. Hgb improving per note from PCP Dr Steele from June, up to 10.6, from 9.4 in March.   Colonoscopy and EGD were also discussed but patient was not interested.  - Continue PO iron    HTN, controlled  Continue losartan, diltiazem, Toprol XL    Rheumatic heart disease s/p bioprosthetic MV and TV, HFpEF, atrial fibrillation, heart block s/p RV pacemaker  No new issues. On Coumadin therapy. Followed closely by cardiology and her PCP.  She continues on diltiazem and metoprolol XL.       Hypothyroidism  On levothyroxine    Discussed with Dr Winkler    Follow-up 3 months with labs, provided patient the number for [place] lab to call and make an appointment prior to next visit    Seamus White  MD  Renal Fellow  [phone]    HPI: Josey Franco is a 56 year old female who presents for follow-up of CKD and hematuria. PMH includes rheumatic heart disease s/p bioprosthetic MV and TV, HFpEF, Afib (on coumadin), heart block s/p RV pacemaker, cholelithiasis and hypothyroidism    Patient was last seen in May. Since then patient notes she has been doing well. Denies any abdominal or flank pain, gross hematuria, dysuria, chest pain, SOB, fevers/chills. Does note some stiffness and pain in bilateral hips.    Received COVID vaccine during Ramadan (May) which caused some muscle and joint pains that have since resolved.    Home BP: 120-140/70    She has known microscopic hematuria and proteinuria.  Her Cr was noted to be elevated in 2018 (2.1 mg/dL) during a time when she likely had a viral illness.  Her Cr has remained at baseline of ~1.2-1.4 mg/dL.  Her albuminuria has improved from 1.26 g/g (peak in 2018) to 0.78 g/g (2/4/2021).  Did undergo a CT urogram in March which was unremarkable.    No Known Allergies    Acetaminophen (TYLENOL PO), Take 325 mg by mouth every 6 hours as needed for mild pain or fever  albuterol (PROAIR HFA, PROVENTIL HFA, VENTOLIN HFA) 108 (90 BASE) MCG/ACT inhaler, Inhale 2 puffs into the lungs  ALPRAZolam (XANAX PO), Take 0.5-1 mg by mouth daily as needed for anxiety  diclofenac (VOLTAREN) 1 % GEL,   diltiazem HCl COATED BEADS 240 MG TB24, Take 240 mg by mouth  ferrous sulfate (FEROSUL) 325 (65 Fe) MG tablet, Take 325 mg by mouth  levothyroxine (SYNTHROID) 25 mcg/mL, Take 25 mcg by mouth daily  losartan (COZAAR) 25 MG tablet, Take 1 tablet (25 mg) by mouth daily  metoprolol succinate (TOPROL-XL) 200 MG 24 hr tablet, Take 100 mg by mouth daily  Multiple Vitamin (DAILY ISMAEL PO), Take by mouth daily  ondansetron (ZOFRAN-ODT) 4 MG disintegrating tablet, Take by mouth every 8 hours as needed for nausea  pantoprazole (PROTONIX) 40 MG EC tablet, Take 1 tablet (40 mg) by mouth daily  VITAMIN D3 1000  units tablet, Take 1,000 Units by mouth daily  Warfarin Sodium (JANTOVEN PO), Take 1 mg by mouth daily 11/28/16: 1 mg Mon/Wed/Fri; 0.5 mg daily rest of week  acetaminophen-codeine (TYLENOL W/CODEINE NO. 3) 300-30 MG per tablet, Take 1 tablet by mouth every 6 hours as needed for mild pain (Patient not taking: Reported on 7/6/2020)  meclizine (ANTIVERT) 25 MG tablet, Take 1 tablet (25 mg) by mouth 3 times daily as needed for dizziness (For vertigo) (Patient not taking: Reported on 12/27/2018)    No current facility-administered medications on file prior to visit.      Past Medical History:   Diagnosis Date     Anxiety disorder due to general medical condition with panic attack      CHF (congestive heart failure) (H)      Depression      History of tricuspid valve replacement with bioprosthetic valve 11/29/2016     Pacemaker 2013    Medtronic (for high degree AVB)     PTSD (post-traumatic stress disorder)      Rheumatic heart disease      S/P mitral valve replacement with bioprosthetic valve 11/29/2016    In 2012 at [facility]     S/P MVR (mitral valve replacement)     bioprosthetic     S/P TVR (tricuspid valve replacement)     bioprosthetic       Past Surgical History:   Procedure Laterality Date     CARDIAC SURGERY      pacemaker     DILATION AND CURETTAGE, OPERATIVE HYSTEROSCOPY, COMBINED N/A 9/9/2015    Procedure: COMBINED DILATION AND CURETTAGE, OPERATIVE HYSTEROSCOPY;  Surgeon: Zoila Pritchard MD;  Location: [facility] OR     MITRAL VALVE REPLACEMENT  2013    Biosynthetic valve     OPERATIVE HYSTEROSCOPY WITH MORCELLATOR N/A 9/9/2015    Procedure: OPERATIVE HYSTEROSCOPY WITH MORCELLATOR;  Surgeon: Zoila Pritchard MD;  Location: [facility] OR     REPLACE VALVE TRICUSPID  2013    Biosynthetic valve       Social History     Tobacco Use     Smoking status: Never Smoker     Smokeless tobacco: Never Used   Substance Use Topics     Alcohol use: No     Drug use: No       Family History   Family history unknown: Yes       ROS: A  12 point review of systems was negative other than noted above.     Exam:  There were no vitals taken for this visit.    GENERAL: NAD  EYES:  No scleral icterus, EOMI  PULM: breathing non-labored, speaking in full sentences  NEURO:  AOx3, speech normal  Psych: Affect normal      Results:    No visits with results within 7 Day(s) from this visit.   Latest known visit with results is:   Ancillary Procedure on 03/01/2021   Component Date Value Ref Range Status     Creatinine 03/01/2021 1.4* 0.52 - 1.04 mg/dL Final     GFR Estimate 03/01/2021 39* >60 mL/min/[1.73_m2] Final     GFR Estimate If Black 03/01/2021 47* >60 mL/min/[1.73_m2] Final       Attestation signed by Jn Winkler MD at 9/13/2021  4:28 PM:  This patient has been evaluated by me, Jn Winkler MD, on 8/23/21.  I discussed with the fellow, Dr. White, and agree with the findings and plan in this note.  I have reviewed medications, vital signs and labs stated in this note.  Telephone time was 30 minutes.      Jn Winkler MD   [phone]      Again, thank you for allowing me to participate in the care of your patient.      Sincerely,    Jn Winkler MD

## 2021-08-23 NOTE — PROGRESS NOTES
Josey is a 56 year old who is being evaluated via a billable telephone visit.      What phone number would you like to be contacted at? [phone]  How would you like to obtain your AVS? Mail a copy  Phone call duration: 30 minutes    August 23, 2021    Assessment/Plan:  CKD  Proteinuria  Microscopic hematuria  Baseline Cr ~0.8 mg/dL with rise in 2018 to 2.4 mg/dL though is much lower recently 1.2-1.4 mg/dL with an eGFR~50 ml/min (2021) which is likely her baseline. Paraproteinemia workup has been negative.  Concerning for IgA nephropathy vs other GN given proteinuria and hematuria but Cr now improved to 1.02 per note from PCP Dr Steele from 6/15/2021, although unable to personally view the actual labs in CareEverywhere.   - Has deferred renal biopsy before  - Remains on ARB.   - Will continue close monitoring and follow-up with labs.    Anemia  Iron deficient and folate level also low. Vit B12 was normal. Hgb improving per note from PCP Dr Steele from June, up to 10.6, from 9.4 in March.   Colonoscopy and EGD were also discussed but patient was not interested.  - Continue PO iron    HTN, controlled  Continue losartan, diltiazem, Toprol XL    Rheumatic heart disease s/p bioprosthetic MV and TV, HFpEF, atrial fibrillation, heart block s/p RV pacemaker  No new issues. On Coumadin therapy. Followed closely by cardiology and her PCP.  She continues on diltiazem and metoprolol XL.       Hypothyroidism  On levothyroxine    Discussed with Dr Winkler    Follow-up 3 months with labs, provided patient the number for [place] lab to call and make an appointment prior to next visit    Seamus White MD  Renal Fellow  [phone]    HPI: Josey Franco is a 56 year old female who presents for follow-up of CKD and hematuria. PMH includes rheumatic heart disease s/p bioprosthetic MV and TV, HFpEF, Afib (on coumadin), heart block s/p RV pacemaker, cholelithiasis and hypothyroidism    Patient was last seen in May. Since then  patient notes she has been doing well. Denies any abdominal or flank pain, gross hematuria, dysuria, chest pain, SOB, fevers/chills. Does note some stiffness and pain in bilateral hips.    Received COVID vaccine during Ramadan (May) which caused some muscle and joint pains that have since resolved.    Home BP: 120-140/70    She has known microscopic hematuria and proteinuria.  Her Cr was noted to be elevated in 2018 (2.1 mg/dL) during a time when she likely had a viral illness.  Her Cr has remained at baseline of ~1.2-1.4 mg/dL.  Her albuminuria has improved from 1.26 g/g (peak in 2018) to 0.78 g/g (2/4/2021).  Did undergo a CT urogram in March which was unremarkable.    No Known Allergies    Acetaminophen (TYLENOL PO), Take 325 mg by mouth every 6 hours as needed for mild pain or fever  albuterol (PROAIR HFA, PROVENTIL HFA, VENTOLIN HFA) 108 (90 BASE) MCG/ACT inhaler, Inhale 2 puffs into the lungs  ALPRAZolam (XANAX PO), Take 0.5-1 mg by mouth daily as needed for anxiety  diclofenac (VOLTAREN) 1 % GEL,   diltiazem HCl COATED BEADS 240 MG TB24, Take 240 mg by mouth  ferrous sulfate (FEROSUL) 325 (65 Fe) MG tablet, Take 325 mg by mouth  levothyroxine (SYNTHROID) 25 mcg/mL, Take 25 mcg by mouth daily  losartan (COZAAR) 25 MG tablet, Take 1 tablet (25 mg) by mouth daily  metoprolol succinate (TOPROL-XL) 200 MG 24 hr tablet, Take 100 mg by mouth daily  Multiple Vitamin (DAILY ISMAEL PO), Take by mouth daily  ondansetron (ZOFRAN-ODT) 4 MG disintegrating tablet, Take by mouth every 8 hours as needed for nausea  pantoprazole (PROTONIX) 40 MG EC tablet, Take 1 tablet (40 mg) by mouth daily  VITAMIN D3 1000 units tablet, Take 1,000 Units by mouth daily  Warfarin Sodium (JANTOVEN PO), Take 1 mg by mouth daily 11/28/16: 1 mg Mon/Wed/Fri; 0.5 mg daily rest of week  acetaminophen-codeine (TYLENOL W/CODEINE NO. 3) 300-30 MG per tablet, Take 1 tablet by mouth every 6 hours as needed for mild pain (Patient not taking: Reported on  7/6/2020)  meclizine (ANTIVERT) 25 MG tablet, Take 1 tablet (25 mg) by mouth 3 times daily as needed for dizziness (For vertigo) (Patient not taking: Reported on 12/27/2018)    No current facility-administered medications on file prior to visit.      Past Medical History:   Diagnosis Date     Anxiety disorder due to general medical condition with panic attack      CHF (congestive heart failure) (H)      Depression      History of tricuspid valve replacement with bioprosthetic valve 11/29/2016     Pacemaker 2013    Medtronic (for high degree AVB)     PTSD (post-traumatic stress disorder)      Rheumatic heart disease      S/P mitral valve replacement with bioprosthetic valve 11/29/2016    In 2012 at [facility]     S/P MVR (mitral valve replacement)     bioprosthetic     S/P TVR (tricuspid valve replacement)     bioprosthetic       Past Surgical History:   Procedure Laterality Date     CARDIAC SURGERY      pacemaker     DILATION AND CURETTAGE, OPERATIVE HYSTEROSCOPY, COMBINED N/A 9/9/2015    Procedure: COMBINED DILATION AND CURETTAGE, OPERATIVE HYSTEROSCOPY;  Surgeon: Zoila Pritchard MD;  [facility]     MITRAL VALVE REPLACEMENT  2013    Biosynthetic valve     OPERATIVE HYSTEROSCOPY WITH MORCELLATOR N/A 9/9/2015    Procedure: OPERATIVE HYSTEROSCOPY WITH MORCELLATOR;  Surgeon: Zoila Pritchard MD;  [facility] OR     REPLACE VALVE TRICUSPID  2013    Biosynthetic valve       Social History     Tobacco Use     Smoking status: Never Smoker     Smokeless tobacco: Never Used   Substance Use Topics     Alcohol use: No     Drug use: No       Family History   Family history unknown: Yes       ROS: A 12 point review of systems was negative other than noted above.     Exam:  There were no vitals taken for this visit.    GENERAL: NAD  EYES:  No scleral icterus, EOMI  PULM: breathing non-labored, speaking in full sentences  NEURO:  AOx3, speech normal  Psych: Affect normal      Results:    No visits with results within  7 Day(s) from this visit.   Latest known visit with results is:   Ancillary Procedure on 03/01/2021   Component Date Value Ref Range Status     Creatinine 03/01/2021 1.4* 0.52 - 1.04 mg/dL Final     GFR Estimate 03/01/2021 39* >60 mL/min/[1.73_m2] Final     GFR Estimate If Black 03/01/2021 47* >60 mL/min/[1.73_m2] Final

## 2021-09-03 ENCOUNTER — LAB (OUTPATIENT)
Dept: LAB | Facility: CLINIC | Age: 56
End: 2021-09-03
Attending: INTERNAL MEDICINE
Payer: COMMERCIAL

## 2021-09-03 DIAGNOSIS — N17.9 AKI (ACUTE KIDNEY INJURY) (H): ICD-10-CM

## 2021-09-03 DIAGNOSIS — R31.29 MICROSCOPIC HEMATURIA: ICD-10-CM

## 2021-09-03 LAB
ALBUMIN UR-MCNC: 30 MG/DL
APPEARANCE UR: CLEAR
BILIRUB UR QL STRIP: NEGATIVE
COLOR UR AUTO: ABNORMAL
FERRITIN SERPL-MCNC: 76 NG/ML (ref 8–252)
GLUCOSE UR STRIP-MCNC: NEGATIVE MG/DL
HGB UR QL STRIP: ABNORMAL
IRON SATN MFR SERPL: 24 % (ref 15–46)
IRON SERPL-MCNC: 66 UG/DL (ref 35–180)
KETONES UR STRIP-MCNC: NEGATIVE MG/DL
LEUKOCYTE ESTERASE UR QL STRIP: NEGATIVE
NITRATE UR QL: NEGATIVE
PH UR STRIP: 6 [PH] (ref 5–7)
RBC URINE: 30 /HPF
SP GR UR STRIP: 1 (ref 1–1.03)
SQUAMOUS EPITHELIAL: <1 /HPF
TIBC SERPL-MCNC: 276 UG/DL (ref 240–430)
UROBILINOGEN UR STRIP-MCNC: NORMAL MG/DL
WBC URINE: 2 /HPF

## 2021-09-03 PROCEDURE — 82306 VITAMIN D 25 HYDROXY: CPT | Mod: 90 | Performed by: PATHOLOGY

## 2021-09-03 PROCEDURE — 81001 URINALYSIS AUTO W/SCOPE: CPT | Performed by: PATHOLOGY

## 2021-09-03 PROCEDURE — 82728 ASSAY OF FERRITIN: CPT | Performed by: PATHOLOGY

## 2021-09-03 PROCEDURE — 83550 IRON BINDING TEST: CPT | Performed by: PATHOLOGY

## 2021-09-03 PROCEDURE — 87086 URINE CULTURE/COLONY COUNT: CPT | Mod: 90 | Performed by: PATHOLOGY

## 2021-09-03 PROCEDURE — 36415 COLL VENOUS BLD VENIPUNCTURE: CPT | Performed by: PATHOLOGY

## 2021-09-04 LAB — BACTERIA UR CULT: NO GROWTH

## 2021-09-08 LAB
DEPRECATED CALCIDIOL+CALCIFEROL SERPL-MC: <32 UG/L (ref 20–75)
VITAMIN D2 SERPL-MCNC: <5 UG/L
VITAMIN D3 SERPL-MCNC: 27 UG/L

## 2021-10-20 ENCOUNTER — PRE VISIT (OUTPATIENT)
Dept: UROLOGY | Facility: CLINIC | Age: 56
End: 2021-10-20

## 2021-10-20 NOTE — TELEPHONE ENCOUNTER
Reason for Visit: Cystoscopy    Diagnosis: Microscopic hematuria    Orders/Procedures/Records: in system    Contact Patient: n/a    Rooming Requirements: Collect Urine      Racquel Mccurdy  10/20/21  5:02 PM

## 2021-10-22 ENCOUNTER — OFFICE VISIT (OUTPATIENT)
Dept: UROLOGY | Facility: CLINIC | Age: 56
End: 2021-10-22
Payer: COMMERCIAL

## 2021-10-22 VITALS — DIASTOLIC BLOOD PRESSURE: 83 MMHG | SYSTOLIC BLOOD PRESSURE: 119 MMHG | HEART RATE: 72 BPM

## 2021-10-22 DIAGNOSIS — R31.21 ASYMPTOMATIC MICROSCOPIC HEMATURIA: Primary | ICD-10-CM

## 2021-10-22 PROCEDURE — 52000 CYSTOURETHROSCOPY: CPT | Performed by: UROLOGY

## 2021-10-22 PROCEDURE — 88112 CYTOPATH CELL ENHANCE TECH: CPT | Mod: 26 | Performed by: PATHOLOGY

## 2021-10-22 PROCEDURE — 88112 CYTOPATH CELL ENHANCE TECH: CPT | Mod: TC | Performed by: UROLOGY

## 2021-10-22 RX ORDER — LIDOCAINE HYDROCHLORIDE 20 MG/ML
JELLY TOPICAL ONCE
Status: ACTIVE | OUTPATIENT
Start: 2021-10-22

## 2021-10-22 ASSESSMENT — PAIN SCALES - GENERAL: PAINLEVEL: NO PAIN (0)

## 2021-10-22 NOTE — LETTER
10/22/2021       RE: Josey Franco  [address]  [address]  [facility] [address]     Dear Colleague,    Thank you for referring your patient, Josey Franco, to the [facility] UROLOGY [facility] [place] at [facility]. Please see a copy of my visit note below.    CYSTOSCOPY PROCEDURE NOTE    Reason for cystoscopy: microhematuria    Brief History: Patient has history of asymptomatic microscopic hematuria.  Of note she is on anticoagulation he is.    CYSTOSCOPY  After obtaining informed consent, the patient was prepped and draped in the standard sterile fashion.  The 15 [employer] flexible cystoscope was inserted through the urethral meatus.      The anterior urethra was:  normal without stricture.    The external sphincter appropriately coapted.   The bladder neck was nonocclusive.    The bladder was  unremarkable for tumors, erythema or stones.    There was minor melanosis of the trigone  The ureteral orifices were identified on each side in orthotopic position with efflux of clear urine.   There were no trabeculations.    On retroflexion there was the usual bladder neck hyperemia.      The patient tolerated the procedure well without complication.        Assessment/Plan:  Will send cytology  If cyto normal, no further w/u needed      Again, thank you for allowing me to participate in the care of your patient.      Sincerely,    Ritchie Terry MD

## 2021-10-22 NOTE — PROGRESS NOTES
CYSTOSCOPY PROCEDURE NOTE    Reason for cystoscopy: microhematuria    Brief History: Patient has history of asymptomatic microscopic hematuria.  Of note she is on anticoagulation he is.    CYSTOSCOPY  After obtaining informed consent, the patient was prepped and draped in the standard sterile fashion.  The 15 [place] flexible cystoscope was inserted through the urethral meatus.      The anterior urethra was:  normal without stricture.    The external sphincter appropriately coapted.   The bladder neck was nonocclusive.    The bladder was  unremarkable for tumors, erythema or stones.    There was minor melanosis of the trigone  The ureteral orifices were identified on each side in orthotopic position with efflux of clear urine.   There were no trabeculations.    On retroflexion there was the usual bladder neck hyperemia.      The patient tolerated the procedure well without complication.        Assessment/Plan:  Will send cytology  If cyto normal, no further w/u needed

## 2021-10-25 LAB
PATH REPORT.COMMENTS IMP SPEC: NORMAL
PATH REPORT.FINAL DX SPEC: NORMAL
PATH REPORT.GROSS SPEC: NORMAL
PATH REPORT.RELEVANT HX SPEC: NORMAL

## 2021-10-26 ENCOUNTER — TELEPHONE (OUTPATIENT)
Dept: UROLOGY | Facility: CLINIC | Age: 56
End: 2021-10-26

## 2021-10-26 NOTE — TELEPHONE ENCOUNTER
Pt phoned wit  services, detailed voicemail left stating urine cytology test is negative. No further work up is required. Pt given care coordinator return phone number to call with any additional questions.

## 2021-12-27 ENCOUNTER — VIRTUAL VISIT (OUTPATIENT)
Dept: NEPHROLOGY | Facility: CLINIC | Age: 56
End: 2021-12-27
Attending: INTERNAL MEDICINE
Payer: COMMERCIAL

## 2021-12-27 DIAGNOSIS — N18.31 CHRONIC KIDNEY DISEASE, STAGE 3A (H): Primary | ICD-10-CM

## 2021-12-27 PROCEDURE — 99443 PR PHYSICIAN TELEPHONE EVALUATION 21-30 MIN: CPT | Mod: 95 | Performed by: INTERNAL MEDICINE

## 2021-12-27 RX ORDER — CYCLOSPORINE 0.5 MG/ML
EMULSION OPHTHALMIC
COMMUNITY
Start: 2021-12-03

## 2021-12-27 ASSESSMENT — PAIN SCALES - GENERAL: PAINLEVEL: NO PAIN (0)

## 2021-12-27 NOTE — LETTER
2021     RE: Josey Franco  [address]  [address]  [facility] [address]     Dear Colleague,    Thank you for referring your patient, Josey Franco, to the [facility] [place] at [facility]. Please see a copy of my visit note below.    Josey is a 56 year old who is being evaluated via a billable telephone visit.      What phone number would you like to be contacted at? [phone]  How would you like to obtain your AVS? Mail a copy  Phone call duration: 25 minutes    Jn Winkler MD         Nephrology Clinic - Telephone Visit    DOS: 21    Name: Josey Franco  MRN: [id number]  Age: 56 year old  : 1965  Referring provider: Edgardo Steele     Assessment and Plan:     1. Acute/Subacute Kidney Injury/CKD 3a: Baseline Cr ~0.8 mg/dL with rise in 2018 to 2.4 mg/dL though is much lower recently 1.2-1.4 mg/dL with an eGFR~50 ml/min () which is likely her baseline.  She has microscopic hematuria and albuminuria, though this has improved from previous (1260 down to 780 mg/g at last check in 2021).  Certainly, her kidney function may have declined from decreased renal perfusion/ischemia from volume depletion and lower blood pressures in 2018.  However, ongoing microscopic hematuria, albuminuria and elevated creatinine raises concern for glomerular process.  An extensive serologic w/u was performed and unremarkable.  Underlying IgA nephropathy remains high on the differential and seems most likely.  Of note, she has had an unremarkable cystoscopy and urine cytology.    - Patient deferred renal biopsy and further treatments previously.   - She voiced understanding of all risks involved with deferring on kidney biopsy in the past  - Fortunately, her renal function has improved suggesting that much of her rise in serum creatinine may have been due to a hemodynamic insult but underlying glomerular  disorder such as IgA seems likely though may not be as active as I initially thought  -continue losartan at 25 mg daily with the goal of minimizing albuminuria and slowing progression of CKD.  - RTC in 6 months.     2. BP/Volume: BP has been at above goal per her son. Asymptomatic and euvolemic on previous exam. Though she was told to increase Metoprolol XL to 200 mg in th psat, she prefers to take 100 mg.   -she will continue metoprolol XL (100 mg) and losartan (25 mg)    -continue with home BP monitoring and report to PCP or renal clinic     3. Anemia: HGB 9.4 in March, 2021. Asymptomatic. No historical features to suggest acute blood loss. She is on coumadin. No daily asprin. May be related to decreased EPO production from renal insufficiency but seems mostly related to iron defficiency which has improved slightly (iron sat 24 and ferritin 76 in Sept, 2021).  Serum  B-12 was normal and serum folate on the lower side.    - Paraproteinemic work-up unremarkable  - Would like to get iron studies at next visit  - Will add Vitamin B12 and RBC folate.  - she no longer appears to be on an iron supplement and a multivitamin; she was encouraged to start iron supplements   - No changes made today.  Will continue to monitor closely.       4. Rheumatic heart disease s/p bioprosthetic MV and TV, HFpEF, atrial fibrillation, heart block s/p RV pacemaker: No new issues. On Coumadin therapy. Followed closely by cardiology and her PCP.  She continues on digoxin and metoprolol XL.       5. Hypothyroidism:  On levothyroxine.    - F/u with PCP    Follow-up: RTC in 3 months      was used to obtain history.     HPI:   Josey Franco is a 56 year old woman with a history of with a history of rheumatic heart disease s/p bioprosthetic MV and TV, HFpEF, atrial fibrillation (on coumadin), heart block s/p RV pacemaker, cholelithiasis and hypothyroidism who presents for follow-up of subacute kidney injury/CKD. I first evaluated the  patient in 2018. Renal ultrasound was negative at that time and patient did not want to move forward with other treatment/renal biopsy, as she wished to discuss findings with her family and was planning to travel home to [facility].    At a previous visit, she was having fevers and feeling ill.. She was previously taking significant amounts of tylenol, but had not been taking NSAIDsl. Currently she is not using any tylenol and feels well. Her blood pressure was typically measured every two weeks by a nurse who comes to her home, and it had been 100/60-70. She also states she was prescribed 200 mg metoprolol by her cardiologist, however she was concerned that this was a large dose, so she only takes 100 mg daily. She does not typically eat much meat. She does note some burning sensation in her chest intermittently as well as some knee pain when she sits down. She reports her palpitations have improved since starting metoprolol. She also appears to be on diltiazem.  She has been taking these daily. She denies any recent difficulty breathing, rash, fevers, cuts, bloody stools, or gross blood in urine. She does not have menstrual periods.  She complains of intermitten edema when sitting for prolonged periods of time.      She has known microscopic hematuria and proteinuria.  Her Cr was noted to be elevated in 2018 (2.1 mg/dL) during a time when she likely had a viral illness. Her Cr has returned  baseline of ~1.2-14 mg/dL.  Her albuminuria improved from 1.26 g/g (peak in 2018) to 0.78 g/g (2/4/2021).  She continues to have microscopic hematuria and underwent a cystoscopy and urine cytology that did not suggest malignancy.  We have not been able to get more recent labs.  She is followed closely by her PCP.      Review of Systems:   Pertinent items are noted in HPI or as below, remainder of complete ROS is negative.      Active Medications:   Current Outpatient Medications   Medication     Acetaminophen (TYLENOL PO)      albuterol (PROAIR HFA, PROVENTIL HFA, VENTOLIN HFA) 108 (90 BASE) MCG/ACT inhaler     ALPRAZolam (XANAX PO)     diclofenac (VOLTAREN) 1 % GEL     diltiazem HCl COATED BEADS 240 MG TB24     ferrous sulfate (FEROSUL) 325 (65 Fe) MG tablet     levothyroxine (SYNTHROID) 25 mcg/mL     losartan (COZAAR) 25 MG tablet     metoprolol succinate (TOPROL-XL) 200 MG 24 hr tablet     Multiple Vitamin (DAILY ISMAEL PO)     ondansetron (ZOFRAN-ODT) 4 MG disintegrating tablet     pantoprazole (PROTONIX) 40 MG EC tablet     RESTASIS 0.05 % ophthalmic emulsion     VITAMIN D3 1000 units tablet     Warfarin Sodium (JANTOVEN PO)     acetaminophen-codeine (TYLENOL W/CODEINE NO. 3) 300-30 MG per tablet     meclizine (ANTIVERT) 25 MG tablet     Current Facility-Administered Medications   Medication     lidocaine (XYLOCAINE) 2 % external gel         Allergies:   The patient reports no allergies.      Past Medical History:  Atrial fibrillation, on coumadin  Chronic cholecystitis   Congestive heart failure  Depression and anxiety   History of mitral/tricuspid valve replacement, on coumadin   PTSD  Rheumatic heart disease    Past Surgical History:  Cardiac pacemaker placement  Mitral valve replacement   Tricuspid valve replacement     Family History:   No family history of kidney disease.     Social History:   Here today with her son. She is non-English speaking. [place].     Physical Exam:  Deferred as encounter was a telephone visit.      Laboratory:  CMP  Recent Labs   Lab Test 03/01/21  1607 12/27/18  1222 12/19/18  1134 11/27/18  1635 11/12/18  1107 11/29/16  0733 11/28/16  1104 11/11/14  1055   NA  --  136 138 137 137   < > 141 136   POTASSIUM  --  3.5 3.8 4.0 4.2   < > 3.6 3.8   CHLORIDE  --  105 105 104 106   < > 108 105   CO2  --  25 25 27 24   < > 23 26   ANIONGAP  --  6 8 6 7   < > 10 5   GLC  --  112* 86 84 78   < > 88 90   BUN  --  13 21 23 23   < > 7 9   CR  --  1.12* 1.41* 1.43* 1.86*   < > 0.70 0.71    GFRESTIMATED 39* 56* 42* 38* 28*   < > 89 88   GFRESTBLACK 47* 65 49* 46* 34*   < > >90   GFR Calc   >90   GFR Calc     ARCADIO  --  8.4* 9.2 8.9 8.8   < > 8.9 8.7   PHOS  --  3.0 4.0 3.9 3.9   < >  --   --    PROTTOTAL  --   --   --   --   --   --  8.4 8.8   ALBUMIN  --  3.4 3.4 3.6 3.4   < > 3.4 3.5   BILITOTAL  --   --   --   --   --   --  0.5 0.4   ALKPHOS  --   --   --   --   --   --  111 151*   AST  --   --   --   --   --   --  27 21   ALT  --   --   --   --   --   --  18 17    < > = values in this interval not displayed.     CBC  Recent Labs   Lab Test 12/19/18  1134 11/08/18  1443 12/02/16  0758 11/30/16  1959 11/30/16  0617   HGB 8.9* 8.4* 11.4* 10.8* 10.4*   WBC  --  6.1 5.8 5.5 4.5   RBC  --  2.85* 3.85 3.61* 3.60*   HCT  --  26.3* 34.1* 32.5* 32.1*   MCV  --  92 89 90 89   MCH  --  29.5 29.6 29.9 28.9   MCHC  --  31.9 33.4 33.2 32.4   RDW  --  13.0 14.4 14.3 14.3   PLT  --  153 162 160 141*     INR  Recent Labs   Lab Test 12/02/16  0758 12/01/16  0643 11/30/16  0617 11/29/16  1830   INR 2.06* 2.52* 3.00* 2.65*     ABG  No lab results found.   URINE STUDIES  Recent Labs   Lab Test 09/03/21  1413 11/08/18  1500 09/01/15  1225 11/11/14  1205   COLOR Straw Yellow Yellow Light Yellow   APPEARANCE Clear Clear Clear Clear   URINEGLC Negative Negative Negative Negative   URINEBILI Negative Negative Negative Negative   URINEKETONE Negative Negative Negative Negative   SG 1.004 1.006 1.009 1.006   UBLD Large* Large* Negative Negative   URINEPH 6.0 6.0 5.0 7.0   PROTEIN 30 * 100* Negative Negative   NITRITE Negative Negative Negative Negative   LEUKEST Negative Trace* Small* Negative   RBCU 30* 150* <1 1   WBCU 2 8* <1 1     Recent Labs   Lab Test 12/19/18  1136 11/08/18  1500   UTPG 1.24* 2.00*     PTH  No lab results found.  IRON STUDIES   Recent Labs   Lab Test 09/03/21  1419 12/27/18  1222   IRON 66 53    295   IRONSAT 24 18   SANDRA 76 84      Phone call duration:  24  minutes    Jn Winkler MD  [phone]

## 2021-12-27 NOTE — PROGRESS NOTES
Josey is a 56 year old who is being evaluated via a billable telephone visit.      What phone number would you like to be contacted at? [phone]  How would you like to obtain your AVS? Mail a copy  Phone call duration: 25 minutes    Jn Winkler MD

## 2021-12-31 ENCOUNTER — TELEPHONE (OUTPATIENT)
Dept: NEPHROLOGY | Facility: CLINIC | Age: 56
End: 2021-12-31
Payer: COMMERCIAL

## 2022-02-04 NOTE — PROGRESS NOTES
Nephrology Clinic - Telephone Visit    DOS: 21    Name: Josey Franco  MRN: [id number]  Age: 56 year old  : 1965  Referring provider: Edgardo Steele     Assessment and Plan:     1. Acute/Subacute Kidney Injury/CKD 3a: Baseline Cr ~0.8 mg/dL with rise in 2018 to 2.4 mg/dL though is much lower recently 1.2-1.4 mg/dL with an eGFR~50 ml/min () which is likely her baseline.  She has microscopic hematuria and albuminuria, though this has improved from previous (1260 down to 780 mg/g at last check in 2021).  Certainly, her kidney function may have declined from decreased renal perfusion/ischemia from volume depletion and lower blood pressures in 2018.  However, ongoing microscopic hematuria, albuminuria and elevated creatinine raises concern for glomerular process.  An extensive serologic w/u was performed and unremarkable.  Underlying IgA nephropathy remains high on the differential and seems most likely.  Of note, she has had an unremarkable cystoscopy and urine cytology.    - Patient deferred renal biopsy and further treatments previously.   - She voiced understanding of all risks involved with deferring on kidney biopsy in the past  - Fortunately, her renal function has improved suggesting that much of her rise in serum creatinine may have been due to a hemodynamic insult but underlying glomerular disorder such as IgA seems likely though may not be as active as I initially thought  -continue losartan at 25 mg daily with the goal of minimizing albuminuria and slowing progression of CKD.  - RTC in 6 months.     2. BP/Volume: BP has been at above goal per her son. Asymptomatic and euvolemic on previous exam. Though she was told to increase Metoprolol XL to 200 mg in  psat, she prefers to take 100 mg.   -she will continue metoprolol XL (100 mg) and losartan (25 mg)    -continue with home BP monitoring and report to PCP or renal clinic     3. Anemia: HGB 9.4 in . Asymptomatic. No  historical features to suggest acute blood loss. She is on coumadin. No daily asprin. May be related to decreased EPO production from renal insufficiency but seems mostly related to iron defficiency which has improved slightly (iron sat 24 and ferritin 76 in Sept, 2021).  Serum  B-12 was normal and serum folate on the lower side.    - Paraproteinemic work-up unremarkable  - Would like to get iron studies at next visit  - Will add Vitamin B12 and RBC folate.  - she no longer appears to be on an iron supplement and a multivitamin; she was encouraged to start iron supplements   - No changes made today.  Will continue to monitor closely.       4. Rheumatic heart disease s/p bioprosthetic MV and TV, HFpEF, atrial fibrillation, heart block s/p RV pacemaker: No new issues. On Coumadin therapy. Followed closely by cardiology and her PCP.  She continues on digoxin and metoprolol XL.       5. Hypothyroidism:  On levothyroxine.    - F/u with PCP    Follow-up: RTC in 3 months      was used to obtain history.     HPI:   Josey Franco is a 56 year old woman with a history of with a history of rheumatic heart disease s/p bioprosthetic MV and TV, HFpEF, atrial fibrillation (on coumadin), heart block s/p RV pacemaker, cholelithiasis and hypothyroidism who presents for follow-up of subacute kidney injury/CKD. I first evaluated the patient in 2018. Renal ultrasound was negative at that time and patient did not want to move forward with other treatment/renal biopsy, as she wished to discuss findings with her family and was planning to travel home to [facility].    At a previous visit, she was having fevers and feeling ill.. She was previously taking significant amounts of tylenol, but had not been taking NSAIDsl. Currently she is not using any tylenol and feels well. Her blood pressure was typically measured every two weeks by a nurse who comes to her home, and it had been 100/60-70. She also states she was prescribed 200 mg  metoprolol by her cardiologist, however she was concerned that this was a large dose, so she only takes 100 mg daily. She does not typically eat much meat. She does note some burning sensation in her chest intermittently as well as some knee pain when she sits down. She reports her palpitations have improved since starting metoprolol. She also appears to be on diltiazem.  She has been taking these daily. She denies any recent difficulty breathing, rash, fevers, cuts, bloody stools, or gross blood in urine. She does not have menstrual periods.  She complains of intermitten edema when sitting for prolonged periods of time.      She has known microscopic hematuria and proteinuria.  Her Cr was noted to be elevated in 2018 (2.1 mg/dL) during a time when she likely had a viral illness. Her Cr has returned  baseline of ~1.2-14 mg/dL.  Her albuminuria improved from 1.26 g/g (peak in 2018) to 0.78 g/g (2/4/2021).  She continues to have microscopic hematuria and underwent a cystoscopy and urine cytology that did not suggest malignancy.  We have not been able to get more recent labs.  She is followed closely by her PCP.      Review of Systems:   Pertinent items are noted in HPI or as below, remainder of complete ROS is negative.      Active Medications:   Current Outpatient Medications   Medication     Acetaminophen (TYLENOL PO)     albuterol (PROAIR HFA, PROVENTIL HFA, VENTOLIN HFA) 108 (90 BASE) MCG/ACT inhaler     ALPRAZolam (XANAX PO)     diclofenac (VOLTAREN) 1 % GEL     diltiazem HCl COATED BEADS 240 MG TB24     ferrous sulfate (FEROSUL) 325 (65 Fe) MG tablet     levothyroxine (SYNTHROID) 25 mcg/mL     losartan (COZAAR) 25 MG tablet     metoprolol succinate (TOPROL-XL) 200 MG 24 hr tablet     Multiple Vitamin (DAILY ISMAEL PO)     ondansetron (ZOFRAN-ODT) 4 MG disintegrating tablet     pantoprazole (PROTONIX) 40 MG EC tablet     RESTASIS 0.05 % ophthalmic emulsion     VITAMIN D3 1000 units tablet     Warfarin Sodium  (JANTOVEN PO)     acetaminophen-codeine (TYLENOL W/CODEINE NO. 3) 300-30 MG per tablet     meclizine (ANTIVERT) 25 MG tablet     Current Facility-Administered Medications   Medication     lidocaine (XYLOCAINE) 2 % external gel         Allergies:   The patient reports no allergies.      Past Medical History:  Atrial fibrillation, on coumadin  Chronic cholecystitis   Congestive heart failure  Depression and anxiety   History of mitral/tricuspid valve replacement, on coumadin   PTSD  Rheumatic heart disease    Past Surgical History:  Cardiac pacemaker placement  Mitral valve replacement   Tricuspid valve replacement     Family History:   No family history of kidney disease.     Social History:   Here today with her son. She is non-English speaking. [place] has been [place].     Physical Exam:  Deferred as encounter was a telephone visit.      Laboratory:  CMP  Recent Labs   Lab Test 03/01/21  1607 12/27/18  1222 12/19/18  1134 11/27/18  1635 11/12/18  1107 11/29/16  0733 11/28/16  1104 11/11/14  1055   NA  --  136 138 137 137   < > 141 136   POTASSIUM  --  3.5 3.8 4.0 4.2   < > 3.6 3.8   CHLORIDE  --  105 105 104 106   < > 108 105   CO2  --  25 25 27 24   < > 23 26   ANIONGAP  --  6 8 6 7   < > 10 5   GLC  --  112* 86 84 78   < > 88 90   BUN  --  13 21 23 23   < > 7 9   CR  --  1.12* 1.41* 1.43* 1.86*   < > 0.70 0.71   GFRESTIMATED 39* 56* 42* 38* 28*   < > 89 88   GFRESTBLACK 47* 65 49* 46* 34*   < > >90   GFR Calc   >90   GFR Calc     ARCADIO  --  8.4* 9.2 8.9 8.8   < > 8.9 8.7   PHOS  --  3.0 4.0 3.9 3.9   < >  --   --    PROTTOTAL  --   --   --   --   --   --  8.4 8.8   ALBUMIN  --  3.4 3.4 3.6 3.4   < > 3.4 3.5   BILITOTAL  --   --   --   --   --   --  0.5 0.4   ALKPHOS  --   --   --   --   --   --  111 151*   AST  --   --   --   --   --   --  27 21   ALT  --   --   --   --   --   --  18 17    < > = values in this interval not displayed.     CBC  Recent Labs   Lab Test  12/19/18  1134 11/08/18  1443 12/02/16  0758 11/30/16  1959 11/30/16  0617   HGB 8.9* 8.4* 11.4* 10.8* 10.4*   WBC  --  6.1 5.8 5.5 4.5   RBC  --  2.85* 3.85 3.61* 3.60*   HCT  --  26.3* 34.1* 32.5* 32.1*   MCV  --  92 89 90 89   MCH  --  29.5 29.6 29.9 28.9   MCHC  --  31.9 33.4 33.2 32.4   RDW  --  13.0 14.4 14.3 14.3   PLT  --  153 162 160 141*     INR  Recent Labs   Lab Test 12/02/16  0758 12/01/16  0643 11/30/16  0617 11/29/16  1830   INR 2.06* 2.52* 3.00* 2.65*     ABG  No lab results found.   URINE STUDIES  Recent Labs   Lab Test 09/03/21  1413 11/08/18  1500 09/01/15  1225 11/11/14  1205   COLOR Straw Yellow Yellow Light Yellow   APPEARANCE Clear Clear Clear Clear   URINEGLC Negative Negative Negative Negative   URINEBILI Negative Negative Negative Negative   URINEKETONE Negative Negative Negative Negative   SG 1.004 1.006 1.009 1.006   UBLD Large* Large* Negative Negative   URINEPH 6.0 6.0 5.0 7.0   PROTEIN 30 * 100* Negative Negative   NITRITE Negative Negative Negative Negative   LEUKEST Negative Trace* Small* Negative   RBCU 30* 150* <1 1   WBCU 2 8* <1 1     Recent Labs   Lab Test 12/19/18  1136 11/08/18  1500   UTPG 1.24* 2.00*     PTH  No lab results found.  IRON STUDIES   Recent Labs   Lab Test 09/03/21  1419 12/27/18  1222   IRON 66 53    295   IRONSAT 24 18   SANDRA 76 84          Phone call duration:  24 minutes    Jn Winkler MD  [phone]

## (undated) RX ORDER — LIDOCAINE HYDROCHLORIDE 20 MG/ML
JELLY TOPICAL
Status: DISPENSED
Start: 2021-10-22